# Patient Record
(demographics unavailable — no encounter records)

---

## 2024-10-23 NOTE — HISTORY OF PRESENT ILLNESS
[de-identified] : 81 year old male with hypertension, CAD on medical therapy, LV dysfunction out of proportion to the degree of CAD, s/p BiV ICD, CHF on Entresto, chronic atrial fibrillation on Eliquis, chronic foot ulcer, PVD, s/p toe amputation, CKD stage 3, dyslipidemia, Type 1 DM, diabetic peripheral neuropathy, acquired hypothyroidism, carotid atherosclerosis, hx of stroke 2014 s/p TPA, TIA 3/19/20.    S/p fall with left femur fracture s/p ORIF 4/19/24 at [facility] and transferred to [facility] from 4/30-5/31/24.   [facility] admission 7/11-7/17/24 for acute stroke with left sided weakness. Eliquis increased to 5mg BID. He has an upcoming visit with EP regarding Watchman procedure.   [facility] ED visit for persistent coughing/sensation of foreign object stuck on throat after swallowing calcium tablet on 9/19/24. Imaging was unremarkable and he was discharged later that day.   He is following with Wound Management regarding his bilateral heel ulcers.  Mr. Fall denies chest pain, SOB, palpitations, or syncope.

## 2024-10-23 NOTE — PROCEDURE
[Atrial Fibrillation] : atrial fibrillation [See Scanned Paceart Report] : See scanned paceart report [CRT-D] : Cardiac resynchronization therapy defibrillator [de-identified] : [facility]

## 2024-10-31 NOTE — HISTORY OF PRESENT ILLNESS
[FreeTextEntry1] : Last Prolia 6/17/24, fifth injection 82 yo WM with brittle HIRAM coming for f/u. On Medtronic pump, does not use bolus wizard, does not enter carbs or BG values. as he does not like it, due to his gastroparesis, tried square waves and he feels like his sugars were low at the end of the bolus for dinner  taking Calcium citrate 600mg bid and Vit D3 at 2,000 IU qd on top of Calcitriol, sees Dr Houser , parathyroid much improved had a fall hurt his knee does not exercise any longer   takes Synthroid at 2am when he goes to the bathroom, then goes back to sleep, still has diarrhea on and off, never increased his Synthroid as advised but TFTs better DEXCOM CGM downloaded and reviewed with the pt Medtronic insulin pump downloaded and reviewed with the pt     type 1 DM/HIRAM since age 27, complicated with nephropathy,retinopathy, ++ gastroparesis sees Dr Dominguez , PVD toe amputations  last seen Nutritionist two years ago   had diabetes classes more than 10 yrs ago , not interested to have again  on Medtronic insulin pump for 10yrs, present pump more than a year, with Humalog   Calibrates CGM once daily sometime less despite advised bid  severe hypoglicemia 1974, 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 checks 3-4 times during exercise and takes snacks.   does roughly 3/4 of an exchange for one unit of Humalog  bolusing 2-10 times a day      Microvascular complications:   Nephropathy yes, last EGFR 31 Cr 2.1, + 1 proteins in urine had 24hr urine for proteins with Dr Doran, had more Advil in the last 2 months due to shoulder pain, stopped completelly a week ago per pt   Neuropathy symptoms, denies Dr. Wray seen him today   Retinopathy yes bilateral lasser surgery and Vitrectomy, cataract last eye exam more than a year ago Dr Funez [place] , Date of Exam Performed 10/17/2018  [facility] reviewed   Macrovascular complications:   HTN at goal on Carvedilol , Entresto   CAD/CVA yes, CVA 18 months ago, left ventricular dysfunction, congestive heart failure, paroxysmal atrial fibrillation, valve abnormalities, coronary artery disease, sees Dr Baron regularly   Lipids at goal on Simvastatin 5mg qd with LFT's normal 4/16   last thyroid US 9/17 IMPRESSION: Mildly enlarged and somewhat heterogeneous thyroid without focal masses. on Synthroid 25mcg qd reports compliance   7/8/24 follow up- Pt now has an aide and ambulates w/ a wheelchair after recent femur fracture. Reports high sugars after hospital discharge and rehab, where he was not able to have his own insulin pump.  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 Got Prolia on 6/17/24. Fifth injection. Has had persistent diarrhea x 1 week. Has appointment with Dr. Dominguez upcoming.  Pt changed his primary care to Dr. Palma. He has a left heal wound after hospital admission to [facility]. Follows at [facility] center with Dr. Cuenca.   7/11/2024 had a stroke, left over left side weakness,  Dr. Haley -Neurologist  could not do MRI  choke on calcium ended up in ER  foot ulcer at [facility] while he had femur fracture doing hyperbaric chambe , will see plastic surgeon for possible skin transplant

## 2024-10-31 NOTE — REVIEW OF SYSTEMS
[Diarrhea] : diarrhea [Dizziness] : dizziness [Pain/Numbness of Digits] : pain/numbness of digits [Poor Balance] : poor balance [Negative] : Heme/Lymph

## 2024-10-31 NOTE — END OF VISIT
[FreeTextEntry3] : All medical record entries made by the Scribe were at my, KERMIT EDWARDS, direction and personally dictated by me on 7/9/2024. I have reviewed the chart and agree that the record accurately reflects my personal performance of the history, physical exam, assessment and plan. I have also personally directed, reviewed, and agreed with the chart.   Documented by Duc Olsen acting as a scribe for KERMIT EDWARDS on 7/9/2024.

## 2024-10-31 NOTE — PHYSICAL EXAM
[Alert] : alert [Well Nourished] : well nourished [Healthy Appearance] : healthy appearance [No Acute Distress] : no acute distress [Well Developed] : well developed [Normal Voice/Communication] : normal voice communication [Normal Sclera/Conjunctiva] : normal sclera/conjunctiva [No Lid Lag] : no lid lag [Normal Outer Ear/Nose] : the ears and nose were normal in appearance [Normal Hearing] : hearing was normal [Normal Lips/Gums] : the lips and gums were normal [No Respiratory Distress] : no respiratory distress [No Accessory Muscle Use] : no accessory muscle use [Normal Rate and Effort] : normal respiratory rate and effort [No Involuntary Movements] : no involuntary movements were seen [No Tremors] : no tremors [Oriented x3] : oriented to person, place, and time [Kyphosis] : no kyphosis present [Scoliosis] : no scoliosis [de-identified] : has pretibial edema b/l , Irregularly irregular Dual-chamber implanted defibrillator, Doctor Anthony, 4/3/2015.  on 2/2022

## 2024-10-31 NOTE — REASON FOR VISIT
[Follow - Up] : a follow-up visit [DM Type 1] : DM Type 1 [DM Type 2] : DM Type 2 [Hypothyroidism] : hypothyroidism [Hyperparathyroidism] : hyperparathyroidism [Spouse] : spouse

## 2024-11-06 NOTE — PHYSICAL EXAM
[General Appearance - Alert] : alert [General Appearance - In No Acute Distress] : in no acute distress [Sclera] : the sclera and conjunctiva were normal [Extraocular Movements] : extraocular movements were intact [Outer Ear] : the ears and nose were normal in appearance [Hearing Threshold Finger Rub Not [place]] : hearing was normal [Neck Appearance] : the appearance of the neck was normal [] : no respiratory distress [Exaggerated Use Of Accessory Muscles For Inspiration] : no accessory muscle use [Apical Impulse] : the apical impulse was normal [Heart Sounds] : normal S1 and S2 [Edema] : there was no peripheral edema [Veins - Varicosity Changes] : there were no varicosital changes [Bowel Sounds] : normal bowel sounds [Abdomen Soft] : soft [No CVA Tenderness] : no ~M costovertebral angle tenderness [No Spinal Tenderness] : no spinal tenderness [Skin Color & Pigmentation] : normal skin color and pigmentation [Cranial Nerves] : cranial nerves 2-12 were intact [Oriented To Time, Place, And Person] : oriented to person, place, and time [Affect] : the affect was normal [FreeTextEntry1] : unsteady gait

## 2024-11-06 NOTE — ASSESSMENT
[FreeTextEntry1] : Pleasant 80 yo male with longstanding DM ( 50+ yrs), CAD ( dual chamber pacemaker), AFIB, CHF(on entresto, lasix prn), BPH ( 5+ x/night on finasteride, US at Urologists several months ago showed no PVR per pt), required lloyd following hip#,, HTN,  peripheral neuropathy, autonomic dysfunction, initially referred for elevated cr ( 2-2.2 from 1/2019 to 7/2019) c/w CKD 3 - has improved, cr 1.8 - 2.3 as of 11/2019 and now over course of hospitalization, is around 1.2-1.5   CKD 3 - cr mid to high 1's,GFR 30's - Given combination of DM/CHF/CKD would start SGLT but is type 1 DM  HTN - at goal  Edema - per wife markedly improved upon awakening after lying flat , recurs once upright - is seated for most of the day, c/w dependentedema - rec ace raps  Anemia - Hgb, iron panel stable - cont nephrocap, no need for procrit.  Appropriately on Entresto and taking lasix prn - potassium stable, no indication for kionex. In re to ANS /orthostasis - given combination of CHF and DM with peripheral neuropathy and autonomic dysfunction limited - has diminished muscle mass along with reduced tone  - advised he start water exercise in hopes of regaining muscle and vascular tone and slowing progression  Hypotension/orthostasis - consider florinef prn ( developed worsening edema previously) - consider midodrine  Constipation -resolved  BPH - seeing Dr. Wilkinson, debating on GL, ? lloyd temporarily - rx flomax, but will refrain due to risk of hypotension   Cardiology, Endocrine, PCP notes reviewed, hosp notes reveiwed BMET, Hgb A1c, PTH, iron panel , CBC reviewed.

## 2024-11-06 NOTE — PHYSICAL EXAM
[General Appearance - Alert] : alert [General Appearance - In No Acute Distress] : in no acute distress [Sclera] : the sclera and conjunctiva were normal [Extraocular Movements] : extraocular movements were intact [Outer Ear] : the ears and nose were normal in appearance [Hearing Threshold Finger Rub Not [place]] : hearing was normal [Neck Appearance] : the appearance of the neck was normal [] : no respiratory distress [Exaggerated Use Of Accessory Muscles For Inspiration] : no accessory muscle use [Apical Impulse] : the apical impulse was normal [Heart Sounds] : normal S1 and S2 [Edema] : there was no peripheral edema [Veins - Varicosity Changes] : there were no varicosital changes [Bowel Sounds] : normal bowel sounds [Abdomen Soft] : soft [No CVA Tenderness] : no ~M costovertebral angle tenderness [No Spinal Tenderness] : no spinal tenderness [Skin Color & Pigmentation] : normal skin color and pigmentation [Cranial Nerves] : cranial nerves 2-12 were intact [Oriented To Time, Place, And Person] : oriented to person, place, and time [Affect] : the affect was normal [FreeTextEntry1] : unsteady gait

## 2024-11-06 NOTE — HISTORY OF PRESENT ILLNESS
[FreeTextEntry1] : Pleasant  81 male with longstanding DM ( 50+ yrs), CAD ( dual chamber pacemaker), AFIB, CHF (on entresto, lasix prn), BPH ( 5+ x/night was on finasteride, US at [facility]rologists showed no PVR per pt), HTN , peripheral neuropathy, autonomic dysfunction,  initially referred for elevated cr ( 2-2.2 from 1/2019 to 7/2019) c/w CKD 3 -   10/2024 f/u for CKD, autonomic dysfunction - s/p hospitalization for hip #, cva, was n rehab with limited recovery, being seen in wound care by IR, vascular and podiatry for le wounds, contine sto struggle with chronic venous insufficiency - wife states that after going to sleep , horizontally, edema nearly completely resolved by am, but over course of day le edema recures  has bruce foot drop  6/2024 here for f/u s/p hospitalization s/p hip #, ckd, autonomic dysfunction, hypotension, orthoistasis during hospitalization has had extreme diff controlling BP ( orthostatic, autonomic dysfunction), multiple confounding issues ( ie pain, pain meds, sleeping meds, hydration, malnutrition...) continues to have PT legs edematous ( edema had, for the most part, resolved prior to hospitalization with Hip#_ had been taking lasix on prn basis, during hospitalization it was held due to hypotension, hasnt resumed lasix, recommend he resume now, keep legs  elevated repeat labs today check xray   s/p surgical excision of melanoma R forearm - states he will need additional surgery, "too close to margins" s/p venous sclerotherapy R lower leg    3/2023 here for f/u ckd, autonomic dysfunction, hypotension s/p surgical excision of melanoma R forearm - states he will need additional surgery, "too close to margins" s/p venous sclerotherapy R lower leg  12/2023 since last visit, cracked R lower molar, complicated by development of " bone spurs" - was taking Motrin daily chronic pain noted to to be elevated since 10/2023  9/2023 here for f/u -  has since developed venous ulcer - seen by IR, wound care autonomic dysfunction DN pacemaker last labs in 2/2023, cr had improved to 1.6, gfr 43  - 2.18/20's,  8/2023 1.9/GFR  33 Endo, Cards, GI note reviewed   6/2023 here for f/u - returned from 16 day trip to [place], sig weight gain autonomic dysfunction DN pacemaker last labs in 2/2023, cr had improved to 1.6, gfr 43 Endo, Cards, GI note reviewed   12/2022 f/u [facility] on CKD/CKD 3/4 - was exp great diff walking due to light headed ness/hypotension attributed to autonomic dysfunction along with loss of proprioception from peripheral neuropathy, d/w cardiology, increased heart to 70 with marked improvement in gait - less unsteadiness  Managing as best he can with disease burden.  Overall stable - cr fluctuating between 1.8 and 2.3 since 2019;   Was using florinef for dizziness hypotension - developed edema, so holding at this time -  BPH with nocturia - anywhere from 3-10x/night, improved with glass of wine  3/1/2023 - cr improved from 2.4 to 2.09 to 1.87, more his baseline and as of 2/2023 was 1.62 - fluid management remains an issue given his le edema and underlying heart disease - pth elevated ( seeing Dr. Jansen) - no longer light headed since increasing HR on pacemaker   9/2022 - cr improved from 2.4 to 2.09 to 1.87, more his baseline - fluid management remains an issue given his le edema and underlying heart disease  6/10/2022 - cr improved from 2.4 to 2.09, more his baseline - fluid management remains an issue given his le edema and underlying heart disease  4/2022 - continues to have diff controlling BS, A1c 7's - cr adrienne to highest on record since 2019 with a level of 2.49 in 2/2022,  suspects it may be 2/2 uncontrolled  blood sugars, will be having new pump as per Dr. Jansen

## 2024-11-06 NOTE — REASON FOR VISIT
[Follow-Up] : a follow-up visit [Spouse] : spouse [FreeTextEntry1] : f/u CKD, hypotension, AUTONOMIC DYSFUNCTION

## 2024-11-06 NOTE — PHYSICAL EXAM
[General Appearance - Alert] : alert [General Appearance - In No Acute Distress] : in no acute distress [Sclera] : the sclera and conjunctiva were normal [Extraocular Movements] : extraocular movements were intact [Outer Ear] : the ears and nose were normal in appearance [Hearing Threshold Finger Rub Not [place]] : hearing was normal [Neck Appearance] : the appearance of the neck was normal [] : no respiratory distress [Exaggerated Use Of Accessory Muscles For Inspiration] : no accessory muscle use [Apical Impulse] : the apical impulse was normal [Heart Sounds] : normal S1 and S2 [Edema] : there was no peripheral edema [Veins - Varicosity Changes] : there were no varicosital changes [Bowel Sounds] : normal bowel sounds [Abdomen Soft] : soft [No CVA Tenderness] : no ~M costovertebral angle tenderness [No Spinal Tenderness] : no spinal tenderness [Skin Color & Pigmentation] : normal skin color and pigmentation [Cranial Nerves] : cranial nerves 2-12 were intact [Oriented To Time, Place, And Person] : oriented to person, place, and time [Affect] : the affect was normal [FreeTextEntry1] : unsteady gait

## 2024-11-06 NOTE — ASSESSMENT
[FreeTextEntry1] : Pleasant 82 yo male with longstanding DM ( 50+ yrs), CAD ( dual chamber pacemaker), AFIB, CHF(on entresto, lasix prn), BPH ( 5+ x/night on finasteride, US at [facility]rolog[facility] several months ago showed no PVR per pt), required lloyd following hip#,, HTN,  peripheral neuropathy, autonomic dysfunction, initially referred for elevated cr ( 2-2.2 from 1/2019 to 7/2019) c/w CKD 3 - has improved, cr 1.8 - 2.3 as of 11/2019 and now over course of hospitalization, is around 1.2-1.5   CKD 3 - cr mid to high 1's,GFR 30's - Given combination of DM/CHF/CKD would start SGLT but is type 1 DM  HTN - at goal  Edema - per wife markedly improved upon awakening after lying flat , recurs once upright - is seated for most of the day, c/w dependentedema - rec ace raps  Anemia - Hgb, iron panel stable - cont nephrocap, no need for procrit.  Appropriately on Entresto and taking lasix prn - potassium stable, no indication for kionex. In re to ANS /orthostasis - given combination of CHF and DM with peripheral neuropathy and autonomic dysfunction limited - has diminished muscle mass along with reduced tone  - advised he start water exercise in hopes of regaining muscle and vascular tone and slowing progression  Hypotension/orthostasis - consider florinef prn ( developed worsening edema previously) - consider midodrine  Constipation -resolved  BPH - seeing Dr. Wilkinson, debating on GL, ? lloyd temporarily - rx flomax, but will refrain due to risk of hypotension   Cardiology, Endocrine, PCP notes reviewed, hosp notes reveiwed BMET, Hgb A1c, PTH, iron panel , CBC reviewed.

## 2024-11-06 NOTE — PHYSICAL EXAM
[General Appearance - Alert] : alert [General Appearance - In No Acute Distress] : in no acute distress [Sclera] : the sclera and conjunctiva were normal [Extraocular Movements] : extraocular movements were intact [Outer Ear] : the ears and nose were normal in appearance [Hearing Threshold Finger Rub Not [place]] : hearing was normal [Neck Appearance] : the appearance of the neck was normal [] : no respiratory distress [Exaggerated Use Of Accessory Muscles For Inspiration] : no accessory muscle use [Apical Impulse] : the apical impulse was normal [Heart Sounds] : normal S1 and S2 [Edema] : there was no peripheral edema [Veins - Varicosity Changes] : there were no varicosital changes [Bowel Sounds] : normal bowel sounds [Abdomen Soft] : soft [No CVA Tenderness] : no ~M costovertebral angle tenderness [No Spinal Tenderness] : no spinal tenderness [Skin Color & Pigmentation] : normal skin color and pigmentation [Cranial Nerves] : cranial nerves 2-12 were intact [Oriented To Time, Place, And Person] : oriented to person, place, and time [Affect] : the affect was normal [FreeTextEntry1] : unsteady gait

## 2024-11-07 NOTE — REASON FOR VISIT
[Follow-Up Visit] : a follow-up visit for [FreeTextEntry1] :  Cerebrovascular event likely secondary to cardiac emboli with thrombolysis, 2014/probable brief TIA with aphasia March 2020 Coronary artery disease with left ventricular dysfunction out of proportion to the degree of disease, improved on therapy. Device therapy/[employer] dual-chamber biventricular pacemaker defibrillator Echo/valve abnormalities Carotid atherosclerosis Type 1 diabetes with chronic kidney disease and additional complications Dyslipidemia Peripheral vascular disease, both arterial and venous.  Status post right-sided venous ablation procedure. Hypertension with episodic orthostatic hypotension.  He has additional medical problems as noted.  His primary care physician is Doctor May Doran.

## 2024-11-07 NOTE — REVIEW OF SYSTEMS
[Negative] : Heme/Lymph [FreeTextEntry2] : Chronic fatigue.  Poor sleeping due to nocturia.  Slightly improved. [FreeTextEntry3] : History of bilateral cataract surgery with lens implant.  Diabetic retinopathy.  Laser surgery.  Right vitrectomy. [FreeTextEntry5] : See HPI. [FreeTextEntry6] : Chronic cough and throat clearing. [FreeTextEntry7] : Chronic episodic abdominal discomfort associated with diabetic gastroparesis, although reportedly the diagnosis is unconfirmed..  Episodic constipation. [FreeTextEntry8] : Significant nocturia, every 2 hours at times, somewhat improved.  Daytime frequency.  ED, permanent.  He has a penile implant.  Consultation with Dr. Wilkinson. [FreeTextEntry9] : Improved left shoulder injury.  Venous and arterial insufficiency.  Status post right-sided venous ablation procedure 2023. [de-identified] : History of squamous cell skin cancer.  Chronic lower extremity lesions. [de-identified] : He has a peripheral neuropathy.  Chronic imbalance.

## 2024-11-07 NOTE — ASSESSMENT
[FreeTextEntry1] : 81M   CVA July 2024 residual L sided weakness worse in leg  Chronic AFIB sCHF (non ischemic) - recovered LV function BiV ICD - generator change 2024 PAD  CAD IDDM  EKG for hx PPM -  biv paced Prior extensive records/imaging personally reviewed  - chronic leg swelling, recommend BNP and to update TTE to help guide his diuretic regimen.  Currently on lasix 20mg daily.  Lungs are clear, no respiratory symptoms.   Chronic renal disease noted. - no signs/sx of acute coronary ischemia  - known PAD mostly below knee, reduced MARLENY on left; he is scheduled for peripheral angiogram given concern for critical limb ischemia with potential for limb loss and life threatening infections.   There is no cardiac contraindication to low risk angiogram as revascularization.  Eliquis can be held 48 hours prior to procedure and should be resumed as soon as possible given his stroke risk.   - BPs are acceptable - continue lopressor 25mg bid, entresto 24-26mg BID - continue lipitor 10mg for CAD/HLD/PAD

## 2024-11-07 NOTE — PHYSICAL EXAM
[Well Developed] : well developed [Well Nourished] : well nourished [No Acute Distress] : no acute distress [Normal Conjunctiva] : normal conjunctiva [Normal Venous Pressure] : normal venous pressure [No Carotid Bruit] : no carotid bruit [Normal S1, S2] : normal S1, S2 [No Murmur] : no murmur [No Rub] : no rub [No Gallop] : no gallop [Clear Lung Fields] : clear lung fields [Good Air Entry] : good air entry [No Respiratory Distress] : no respiratory distress  [Soft] : abdomen soft [Non Tender] : non-tender [No Masses/organomegaly] : no masses/organomegaly [Normal Bowel Sounds] : normal bowel sounds [No Cyanosis] : no cyanosis [No Clubbing] : no clubbing [No Varicosities] : no varicosities [No Rash] : no rash [Moves all extremities] : moves all extremities [Normal Speech] : normal speech [Alert and Oriented] : alert and oriented [Normal memory] : normal memory [de-identified] : Neuropathic gait. [de-identified] : 1-2+ pitting edema b/l.  Feet wrapped in bandaging.  Status post 2 right toe amputations. [de-identified] : in wheelchair, left sided weakness

## 2024-11-07 NOTE — HISTORY OF PRESENT ILLNESS
[FreeTextEntry1] : This 81 year-old male former patient of Dr. Baron.  NICM dx ~ 2014, no significant CAD on cath at the time  (distal LCx 80%), HTN, AV block s/p BiV ICD, chronic atrial fibrillation on Eliquis, chronic foot ulcer, PAD, s/p toe amputations, CKD stage 3, dyslipidemia, Type 1 DM, diabetic peripheral neuropathy, acquired hypothyroidism, carotid atherosclerosis, hx of stroke 2014 s/p TPA, TIA 3/19/20, and recent stroke July 2024 with residual L sided weakness.  Eliquis increased to 5mg BID after this most recent stroke.  He was also in hospital/rehab for extended period of time in April/May 2024 for a left femur fracture that required repair and rehab.    He is currently minimally ambulatory due to the L leg weakness and has been dealing with chronic bilateral heel ulcerations requiring regular wound care.  He has a hx of PAD and is scheduled for peripheral angio next week with Dr. Zayas.   He has no complaints of SOB or CP.  He has chronic LE edema which per patient is improving.  He takes lasix 20mg daily.

## 2024-11-07 NOTE — CARDIOLOGY SUMMARY
[de-identified] : EKG 6/1/2021.  Underlying atrial fibrillation likely, unconfirmed.  Biventricular pacing with rightward axis.  No significant change. [de-identified] : Holter 7/9/2014. Sinus rhythm. Occasional VPC. Occasional APC. No evidence of  recurrent atrial fibrillation (status post cardioversion). No diary entries.  [de-identified] : Lexiscan MIBI 5/6/14. EF 25%. Fixed inferior defect with generalized hypokinesia. No  overt ischemia.\par  Stress test 5/1/15. 4 minutes. Intermediate protocol. [place] stage I. 3.5 METs.  57%. Nondiagnostic ECG (LBBB). \par   [de-identified] : Echo July 2023.  Normal left ventricular chamber size, thickness and overall systolic function.  EF estimated 55%.  Mildly dilated left and right atrium.  Mild MAC and chordal calcification.  Trace MR.  Mild aortic calcification without stenosis.  Trivial AI.  Device lead visualized right heart.  Trace TR.  Mildly elevated estimated PA, 51 mmHg.  Normal RV systolic function.  Compared with 3/30/2021, no significant change.  Echo.  3/30/2021.  A.  F.  Normal LV function and wall motion.  EF 60%.  Dilated atria.  MAC.  Mild or moderate MR.  Aortic sclerosis.  Trace AI.  PA 55.  Echo 8/2/19. A. F. Normal LV function. EF 65%. LVH, 1.3 cm. Dilated left and right atrium.     Calcified chordae tendineae. Moderate MR. Aortic calcification without stenosis. Mild TR.     PA 40 mm of mercury. Similar to 2017. Echo 4/11/2016. Normal LV function. Probable normal wall motion. LVH, 1.2 cm. EF 65%.    Mildly thickened mitral valve, mild MAC. Trace MR. Trivial aortic sclerosis with trace AI.   Gradient 9/4. PA 17-22.   JAMEL 6/4/14. EF 20-25%. Performed pre-cardioversion.  [de-identified] : MUGA 2/23/15. Ischemic cardiomyopathy, EF 33%.\par   [de-identified] : Dual-chamber implanted defibrillator, Doctor Anthony, 4/3/2015. LV lead could not be placed  initially. Re-attempt 7/2015 was successful [employer] ( Guidant ) device\par   [de-identified] : Cardiac catheterization 6/4/14. Distal LCx 80% stenosis, 40% proximal. Medical therapy. LV dysfunction is out of proportion to the degree of CAD. \par   [de-identified] : Vascular evaluation 3/5/2022.  Greater than 75% stenosis right distal popliteal/proximal tibial peroneal trunk.  Right occluded peroneal artery.  Left occluded peroneal and anterior tibial artery.  Bilateral dorsalis pedis arteries not visualized.  Heavily calcification trifurcation.  Borderline reduced ABIs.\par  Carotid duplex 12/11/2019. Mild plaque bilateral. No change from 2015.\par   [de-identified] : CT scan chest 1/ Left lower lobe consolidationand associated layering pleural effusion.  Coronary calcifications.\par  PFT 1/13/16. Moderately severe diffusion defect, unchanged from 2014.\par  PFT obtained from 6/30/2014. Minimal obstructive airways disease. Moderate diffusion  defect. Refer to complete report. DLCO 56% predicted. Baseline study. Amiodarone just  started. \par  \par

## 2024-11-19 NOTE — ASSESSMENT
[FreeTextEntry1] : Mr. KASI PETERSON is a 81-year-old male here today for secondary stroke prevention   Would advise the following :  1) compliance with anticoagulants Eliquis 5 mg bid  2) Assure BP goal < 120/80 mm Hg maintained 3) continue current statin as LDL on 10/24 66 4) Incontinence may be indirectly related to stroke as it limits mobility; site of actual cerebral injury unknown as he cannot get BELLE brain  5) Discussed resuming Continue PT at minimum 2 times per week as he is showing regression in stroke recovery 6) We discussed PT goals, and he hopes to get to walking with walker short distances again which also will minimize risk of falls 
2022

## 2024-11-19 NOTE — REVIEW OF SYSTEMS
[Fever] : no fever [Chills] : no chills [Feeling Poorly] : feeling poorly [Feeling Tired] : feeling tired [Suicidal] : not suicidal [Sleep Disturbances] : no sleep disturbances [Anxiety] : no anxiety [Depression] : no depression [Confused or Disoriented] : no confusion [Memory Lapses or Loss] : no memory loss [Decr. Concentrating Ability] : no decrease in concentrating ability [Difficulty with Language] : no ~M difficulty with language [Changed Thought Patterns] : no change in thought patterns [Repeating Questions] : no repeated questioning about recent events [Facial Weakness] : no facial weakness [Arm Weakness] : no arm weakness [Hand Weakness] : no hand weakness [Leg Weakness] : no leg weakness [Poor Coordination] : poor coordination [Difficulty Writing] : no difficulty writing [Difficulties in Speech] : no speech difficulties [Numbness] : no numbness [Tingling] : no tingling [Abnormal Sensation] : no abnormal sensation [Hypersensitivity] : no hypersensitivity [Seizures] : no convulsions [Dizziness] : no dizziness [Fainting] : no fainting [Lightheadedness] : no lightheadedness [Vertigo] : no vertigo [Cluster Headache] : no cluster headache [Migraine Headache] : no migraine headache [Tension Headache] : no tension-type headache [Difficulty Walking] : difficulty walking [Inability to Walk] : inability to walk [Ataxia] : ataxia [Frequent Falls] : not falling [Limping] : not limping [Eye Pain] : no eye pain [Eyesight Problems] : no eyesight problems [Nosebleeds] : no nosebleeds [Heart Rate Is Slow] : the heart rate was not slow [Heart Rate Is Fast] : the heart rate was not fast [Chest Pain] : no chest pain [Palpitations] : no palpitations [Shortness Of Breath] : no shortness of breath [Cough] : no cough [Incontinence] : incontinence [Arthralgias] : no arthralgias [Joint Pain] : no joint pain [Joint Swelling] : no joint swelling [Limb Pain] : no limb pain [Limb Swelling] : limb swelling [As Noted in HPI] : as noted in HPI [Skin Wound] : skin wound

## 2024-11-19 NOTE — HISTORY OF PRESENT ILLNESS
[FreeTextEntry1] : Mr. KASI PETERSON is a 81 year old male here today for neurology follow up for stroke prevention Hx stroke 2014 received TPA (no residual deficits), TIA ( transient aphasia) when Eliquis at 2.5 bid, afib on Eliquis 5 mg BID now , AICD with [employer], DM type 1, diabetic peripheral neuropathy, atherosclerotic PVD, BPH, CAD, carotid atherosclerosis, CKD 3, HLD, hyperparathyroidism, and left traumatic femur fracture , and acute stroke leading to hospitalization again in July 2024 resulting in LHP. He has been troubled with sore over heel of left foot which is limiting ambulation. He also reports that home PT only occurring once per week as there are not enough therapist in the home therapy team. He has noted that LUE is getting weaker and clumsy again with less PT.  He is also getting VNS to help with wound care. He just underwent procedure with Dr. Stout to improve distal peripheral blood flow in left leg and hyperbaric treatment to accelerate wound healing. He is going to see a plastic surgeon for possible skin graft soon.  He also had battery replaced in ICD.

## 2024-11-19 NOTE — PHYSICAL EXAM
[FreeTextEntry1] : PHYSICAL EXAM BLE edema up to knees Pt cannot move his toes b/l, unable to raise heels off the ground.   Mental Status Awake, alert and oriented to person, place, time and situation. Provides detailed history.  Speech: clear , fluent, follows verbal requests   Cranial Nerves II: VFF III, IV, VI: PERRL, EOMI. V: Facial sensation is normal B/L. VII: left facial asymmetry noted  VIII: wnl IX, X: Palate is midline and elevates symmetrically. XI: Trapezius normal strength. XII: Tongue midline without atrophy or fasciculations.  MOTOR EXAM Muscle tone - no evidence of rigidity or resistance in all 4 extremities. LUE 4/5  and LLE proximal hip flexor and knee extendor  but he cannot move feet  RUE and RLE 5/5 except dorsiflexion 1/5   REFLEXES 1+ arm, 2+ knee, absent ankle Right Plantar: mute. Left Plantar: mute.   COORDINATION Finger to nose: dysmetria noted with LUE  Heel to shin: limited    SENSORY Marked impaired sensation in both legs in stocking distribution ot light touch, pinprick and temperature Impaired proprioception in toes impaired vibration at toes and ankle  reduced light touch and pinprick in hands in glove fashioin    GAIT cannot ambulate

## 2024-11-19 NOTE — DATA REVIEWED
[de-identified] : Exam Date: 05/18/17 Exam: CT LUMBAR SPINE Order#: [id number] PROCEDURE: CT scan lumbar spine, 5/18/2017 TECHNIQUE:  views. Axial images. Multiple window and level settings. Sagittal and coronal reconstructions. Oblique axial reconstructions. COMPARISON: CT scan 4/29/2016. CLINICAL INFORMATION: Lumbar radiculopathy. Patient reports difficulty with ambulation possibly indicating diabetic neuropathy. IMAGING FINDINGS:  views demonstrate defibrillator electrodes. A penile implant is demonstrated. Patient should not undergo MRI imaging unless these devices are known to be MRI compatible and appropriate conditions are met. There is decreased bone density consistent with osteopenia/osteoporosis. There is no focal lytic or blastic malignant-type bone destruction. There is no vertebral compression fracture. There are small anterior/bilateral vertebral body osteophytes at multiple levels, spondylosis deformans. T11-T12: No bulge or herniation. Mild facet osteoarthritis. T12-L1: No bulge or herniation. L1-L2: Mild disc bulge extends to the left side of the vertebral body. There is mild left foramen narrowing, but no nerve root compression. L2-L3: Mild disc bulge. L3-L4: Mild disc bulge. Thickening of the ligamentum flavum. Mild central spinal stenosis without significant change. L4-L5: Disc bulge. Thickening of the ligamentum flavum. Moderate central spinal stenosis. There is subarticular zone stenosis with bilateral L5 nerve root compression. Moderate foramen stenosis with mild bilateral L4 nerve root compression. No significant change (although I described findings differently on my report of the previous study). L5-S1: Mild facet osteoarthritis and thickening of the ligamentum flavum. Minimal degenerative anterolisthesis. Mild disc bulge. Disc bulge extends more to the right than the left, but there is no focal disc herniation. Mild central spinal stenosis with mild compression of bilateral S1 nerve roots, right greater than left. Degenerative osteophytes of sacroiliac joints are again demonstrated. There is no paraspinal mass. Again demonstrated is atherosclerotic calcification in the abdominal aorta and multiple branches. The study includes limited images of the entire abdomen/pelvis. Equivalent comparison images are not available. Study was not performed with body imaging technique. Study demonstrates multiple superficial varicosities of the abdominal wall on both sides. There is no gross evidence of liver lesion or inferior vena cava lesion. Recommend correlation with clinical examination to determine whether or not there has been a change in the appearance of the superficial varicosities that might prompt further evaluation. IMPRESSION: No significant interval change. (My description of some findings differs from my description on the report of the previous study. Current report is based on evaluation of images from both studies.) Moderate central spinal stenosis L4-L5. Moderate bilateral foramen stenosis L4-L5. Mild central spinal stenosis L3-L4, L5-S1. Disc bulge L5-S1 extends more to the right than the left greater compression of the right side of the thecal sac in the right S1 nerve root at the left side of the thecal sac than the left S1 nerve root. No focal disc herniation. There are multiple superficial varicosities in the abdominal wall. Finding may be incidental. Please correlate with clinical examination is to whether or not these varicosities have changed. ********************************************************************* EMG nerve conduction studies in June of 2017 which showed a severe sensory motor peripheral neuropathy. ******************************************************************* exam Date:      03/18/20    [facility]                                                             Exam:         CT HEAD-CODE STROKE ED                                                           Order#:       [id number]                                                                HEAD CT WITHOUT CONTRAST dated 3/18/2020 11:28 PM        HISTORY: Difficulty speaking. Stroke code.      TECHNIQUE: Head CT was performed without intravenous contrast. Axial, sagittal, and coronal images  were obtained.     COMPARISON: Head CT from 2/11/2014.      FINDINGS:   There are prominent ventricles and sulci most compatible with age-appropriate generalized  parenchymal volume loss. No acute transcortical infarction, hemorrhage, or mass effect is  identified. Chronic lacunar infarcts are again seen within the bilateral lentiform nuclei and left  frontal white matter. Grossly stable mild hypodensities in the supratentorial white matter are  consistent with chronic microvascular ischemic disease.       The calvarium is intact. The visualized paranasal sinuses and mastoid air cells are clear. Both  native lenses are absent.    IMPRESSION:     No evidence of acute transcortical infarction or hemorrhage. No substantial change since 2014.

## 2024-11-27 NOTE — HISTORY OF PRESENT ILLNESS
[FreeTextEntry1] : Last Prolia 6/17/24, fifth injection 82 yo WM with brittle HIRAM coming for f/u. On Medtronic pump, does not use bolus wizard, does not enter carbs or BG values. as he does not like it, due to his gastroparesis, tried square waves and he feels like his sugars were low at the end of the bolus for dinner  taking Calcium citrate 600mg bid and Vit D3 at 2,000 IU qd on top of Calcitriol, sees Dr Houser , parathyroid much improved had a fall hurt his knee does not exercise any longer   takes Synthroid at 2am when he goes to the bathroom, then goes back to sleep, still has diarrhea on and off, never increased his Synthroid as advised but TFTs better DEXCOM CGM downloaded and reviewed with the pt Medtronic insulin pump downloaded and reviewed with the pt     type 1 DM/HIRAM since age 27, complicated with nephropathy,retinopathy, ++ gastroparesis sees Dr Dominguez , PVD toe amputations  last seen Nutritionist two years ago   had diabetes classes more than 10 yrs ago , not interested to have again  on Medtronic insulin pump for 10yrs, present pump more than a year, with Humalog   Calibrates CGM once daily sometime less despite advised bid  severe hypoglicemia 1974, 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 checks 3-4 times during exercise and takes snacks.   does roughly 3/4 of an exchange for one unit of Humalog  bolusing 2-10 times a day      Microvascular complications:   Nephropathy yes, last EGFR 31 Cr 2.1, + 1 proteins in urine had 24hr urine for proteins with Dr Doran, had more Advil in the last 2 months due to shoulder pain, stopped completelly a week ago per pt   Neuropathy symptoms, denies Dr. Wray seen him today   Retinopathy yes bilateral lasser surgery and Vitrectomy, cataract last eye exam more than a year ago Dr Funez [place] , Date of Exam Performed 10/17/2018  [facility] reviewed   Macrovascular complications:   HTN at goal on Carvedilol , Entresto   CAD/CVA yes, CVA 18 months ago, left ventricular dysfunction, congestive heart failure, paroxysmal atrial fibrillation, valve abnormalities, coronary artery disease, sees Dr Baron regularly   Lipids at goal on Simvastatin 5mg qd with LFT's normal 4/16   last thyroid US 9/17 IMPRESSION: Mildly enlarged and somewhat heterogeneous thyroid without focal masses. on Synthroid 25mcg qd reports compliance   7/8/24 follow up- Pt now has an aide and ambulates w/ a wheelchair after recent femur fracture. Reports high sugars after hospital discharge and rehab, where he was not able to have his own insulin pump.  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 Got Prolia on 6/17/24. Fifth injection. Has had persistent diarrhea x 1 week. Has appointment with Dr. Dominguez upcoming.  Pt changed his primary care to Dr. Palma. He has a left heal wound after hospital admission to [facility]. Follows at [facility] center with Dr. Cuenca.   7/11/2024 had a stroke, left over left side weakness,  Dr. Haley -Neurologist  could not do MRI  choke on calcium ended up in ER  foot ulcer at [facility] while he had femur fracture doing hyperbaric chambe , will see plastic surgeon for possible skin transplant

## 2024-11-27 NOTE — HISTORY OF PRESENT ILLNESS
[FreeTextEntry1] : Last Prolia 6/17/24, fifth injection 80 yo WM with brittle HIRAM coming for f/u. On Medtronic pump, does not use bolus wizard, does not enter carbs or BG values. as he does not like it, due to his gastroparesis, tried square waves and he feels like his sugars were low at the end of the bolus for dinner  taking Calcium citrate 600mg bid and Vit D3 at 2,000 IU qd on top of Calcitriol, sees Dr Houser , parathyroid much improved had a fall hurt his knee does not exercise any longer   takes Synthroid at 2am when he goes to the bathroom, then goes back to sleep, still has diarrhea on and off, never increased his Synthroid as advised but TFTs better DEXCOM CGM downloaded and reviewed with the pt Medtronic insulin pump downloaded and reviewed with the pt     type 1 DM/HIRAM since age 27, complicated with nephropathy,retinopathy, ++ gastroparesis sees Dr Dominguez , PVD toe amputations  last seen Nutritionist two years ago   had diabetes classes more than 10 yrs ago , not interested to have again  on Medtronic insulin pump for 10yrs, present pump more than a year, with Humalog   Calibrates CGM once daily sometime less despite advised bid  severe hypoglicemia 1974, 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 checks 3-4 times during exercise and takes snacks.   does roughly 3/4 of an exchange for one unit of Humalog  bolusing 2-10 times a day      Microvascular complications:   Nephropathy yes, last EGFR 31 Cr 2.1, + 1 proteins in urine had 24hr urine for proteins with Dr Doran, had more Advil in the last 2 months due to shoulder pain, stopped completelly a week ago per pt   Neuropathy symptoms, denies Dr. Wray seen him today   Retinopathy yes bilateral lasser surgery and Vitrectomy, cataract last eye exam more than a year ago Dr Funez [place] , Date of Exam Performed 10/17/2018  [facility] reviewed   Macrovascular complications:   HTN at goal on Carvedilol , Entresto   CAD/CVA yes, CVA 18 months ago, left ventricular dysfunction, congestive heart failure, paroxysmal atrial fibrillation, valve abnormalities, coronary artery disease, sees Dr Baron regularly   Lipids at goal on Simvastatin 5mg qd with LFT's normal 4/16   last thyroid US 9/17 IMPRESSION: Mildly enlarged and somewhat heterogeneous thyroid without focal masses. on Synthroid 25mcg qd reports compliance   7/8/24 follow up- Pt now has an aide and ambulates w/ a wheelchair after recent femur fracture. Reports high sugars after hospital discharge and rehab, where he was not able to have his own insulin pump.  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 Got Prolia on 6/17/24. Fifth injection. Has had persistent diarrhea x 1 week. Has appointment with Dr. Dominguez upcoming.  Pt changed his primary care to Dr. Palma. He has a left heal wound after hospital admission to [facility]. Follows at [facility] center with Dr. Cuenca.   7/11/2024 had a stroke, left over left side weakness,  Dr. Haley -Neurologist  could not do MRI  choke on calcium ended up in ER  foot ulcer at [facility] while he had femur fracture doing hyperbaric chambe , will see plastic surgeon for possible skin transplant

## 2024-12-08 NOTE — HISTORY OF PRESENT ILLNESS
[Home] : at home, [unfilled] , at the time of the visit. [Medical Office: ([facility])___] : at the medical office located in  [FreeTextEntry1] : Last Prolia 6/17/24, fifth injection 80 yo WM with brittle HIRAM coming for f/u. On Medtronic pump, does not use bolus wizard, does not enter carbs or BG values. as he does not like it, due to his gastroparesis, tried square waves and he feels like his sugars were low at the end of the bolus for dinner  taking Calcium citrate 600mg bid and Vit D3 at 2,000 IU qd on top of Calcitriol, sees Dr Houser , parathyroid much improved had a fall hurt his knee does not exercise any longer   takes Synthroid at 2am when he goes to the bathroom, then goes back to sleep, still has diarrhea on and off, never increased his Synthroid as advised but TFTs better DEXCOM CGM downloaded and reviewed with the pt Medtronic insulin pump downloaded and reviewed with the pt     type 1 DM/HIRAM since age 27, complicated with nephropathy,retinopathy, ++ gastroparesis sees Dr Dominguez , PVD toe amputations  last seen Nutritionist two years ago   had diabetes classes more than 10 yrs ago , not interested to have again  on Medtronic insulin pump for 10yrs, present pump more than a year, with Humalog   Calibrates CGM once daily sometime less despite advised bid  severe hypoglicemia 1974, 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 checks 3-4 times during exercise and takes snacks.   does roughly 3/4 of an exchange for one unit of Humalog  bolusing 2-10 times a day      Microvascular complications:   Nephropathy yes, last EGFR 31 Cr 2.1, + 1 proteins in urine had 24hr urine for proteins with Dr Doran, had more Advil in the last 2 months due to shoulder pain, stopped completelly a week ago per pt   Neuropathy symptoms, denies Dr. Wray seen him today   Retinopathy yes bilateral lasser surgery and Vitrectomy, cataract last eye exam more than a year ago Dr Funez [place] , Date of Exam Performed 10/17/2018  [facility] reviewed   Macrovascular complications:   HTN at goal on Carvedilol , Entresto   CAD/CVA yes, CVA 18 months ago, left ventricular dysfunction, congestive heart failure, paroxysmal atrial fibrillation, valve abnormalities, coronary artery disease, sees Dr Baron regularly   Lipids at goal on Simvastatin 5mg qd with LFT's normal 4/16   last thyroid US 9/17 IMPRESSION: Mildly enlarged and somewhat heterogeneous thyroid without focal masses. on Synthroid 25mcg qd reports compliance   7/8/24 follow up- Pt now has an aide and ambulates w/ a wheelchair after recent femur fracture. Reports high sugars after hospital discharge and rehab, where he was not able to have his own insulin pump.  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 Got Prolia on 6/17/24. Fifth injection. Has had persistent diarrhea x 1 week. Has appointment with Dr. Dominguez upcoming.  Pt changed his primary care to Dr. Palma. He has a left heal wound after hospital admission to [facility]. Follows at [facility] center with Dr. Cuenca.   7/11/2024 had a stroke, left over left side weakness,  Dr. Haley -Neurologist  could not do MRI  choke on calcium ended up in ER  foot ulcer at [facility] while he had femur fracture doing hyperbaric chamber , will see plastic surgeon for possible skin transplant   11/27/24 pt did not receive the info about SS and CHO counting from RN per pt, asking to talk with MD see task

## 2024-12-19 NOTE — ASSESSMENT
[FreeTextEntry1] : Pleasant 82 yo male with longstanding DM ( 50+ yrs), CAD ( dual chamber pacemaker), AFIB, CHF(on entresto, lasix prn), BPH ( 5+ x/night on finasteride, US at Urologists several months ago showed no PVR per pt), required lloyd following hip#, HTN,  peripheral neuropathy, autonomic dysfunction, initially referred for elevated cr ( 2-2.2 from 1/2019 to 7/2019) c/w CKD 3 - has improved, cr 1.8 - 2.3 as of 11/2019 improved to around 1.2-1.5, but has since increased to 2-2.1.   CKD 3 - cr mid to high 1's,GFR 30's - Given combination of DM/CHF/CKD would start SGLT but is type 1 DM  HTN - at goal - ran out of metoprolol, refill  Edema -has lost approx 23 lbs since combining eplerenone and lasix - check bmet   Anemia - Hgb, iron panel stable  - cont nephrocap, no need for procrit.  Hypotension/orthostasis - consider florinef prn ( developed worsening edema previously) - consider midodrine  Constipation -resolved  BPH - seeing Dr. Wilkinson, debating on GL, ? lolyd temporarily - rx flomax, but will refrain due to risk of hypotension   Cardiology, Endocrine, PCP notes reviewed, hosp notes reveiwed BMET, Hgb A1c, PTH, iron panel , CBC reviewed.

## 2024-12-19 NOTE — HISTORY OF PRESENT ILLNESS
[FreeTextEntry1] : Pleasant  81 male with longstanding DM ( 50+ yrs), CAD ( dual chamber pacemaker), AFIB, CHF (on entresto, lasix prn), BPH ( 5+ x/night was on finasteride, US at Urologists showed no PVR per pt), HTN , peripheral neuropathy, autonomic dysfunction,  initially referred for elevated cr ( 2-2.2 from 1/2019 to 7/2019) c/w CKD 3 -   12/2024  10/2024 f/u for CKD, autonomic dysfunction - s/p hospitalization for hip #, cva, was n rehab with limited recovery, being seen in wound care by IR, vascular and podiatry for le wounds, contine sto struggle with chronic venous insufficiency - wife states that after going to sleep , horizontally, edema nearly completely resolved by am, but over course of day le edema recures  has bruce foot drop  6/2024 here for f/u s/p hospitalization s/p hip #, ckd, autonomic dysfunction, hypotension, orthoistasis during hospitalization has had extreme diff controlling BP ( orthostatic, autonomic dysfunction), multiple confounding issues ( ie pain, pain meds, sleeping meds, hydration, malnutrition...) continues to have PT legs edematous ( edema had, for the most part, resolved prior to hospitalization with Hip#_ had been taking lasix on prn basis, during hospitalization it was held due to hypotension, hasnt resumed lasix, recommend he resume now, keep legs  elevated repeat labs today check xray   s/p surgical excision of melanoma R forearm - states he will need additional surgery, "too close to margins" s/p venous sclerotherapy R lower leg    3/2023 here for f/u ckd, autonomic dysfunction, hypotension s/p surgical excision of melanoma R forearm - states he will need additional surgery, "too close to margins" s/p venous sclerotherapy R lower leg  12/2023 since last visit, cracked R lower molar, complicated by development of " bone spurs" - was taking Motrin daily chronic pain noted to to be elevated since 10/2023  9/2023 here for f/u -  has since developed venous ulcer - seen by IR, wound care autonomic dysfunction DN pacemaker last labs in 2/2023, cr had improved to 1.6, gfr 43  - 2.18/20's,  8/2023 1.9/GFR  33 Endo, Cards, GI note reviewed   6/2023 here for f/u - returned from 16 day trip to [place], sig weight gain autonomic dysfunction DN pacemaker last labs in 2/2023, cr had improved to 1.6, gfr 43 Endo, Cards, GI note reviewed   12/2022 f/u SOMMER on CKD/CKD 3/4 - was exp great diff walking due to light headed ness/hypotension attributed to autonomic dysfunction along with loss of proprioception from peripheral neuropathy, d/w cardiology, increased heart to 70 with marked improvement in gait - less unsteadiness  Managing as best he can with disease burden.  Overall stable - cr fluctuating between 1.8 and 2.3 since 2019;   Was using florinef for dizziness hypotension - developed edema, so holding at this time -  BPH with nocturia - anywhere from 3-10x/night, improved with glass of wine  3/1/2023 - cr improved from 2.4 to 2.09 to 1.87, more his baseline and as of 2/2023 was 1.62 - fluid management remains an issue given his le edema and underlying heart disease - pth elevated ( seeing Dr. Jansen) - no longer light headed since increasing HR on pacemaker   9/2022 - cr improved from 2.4 to 2.09 to 1.87, more his baseline - fluid management remains an issue given his le edema and underlying heart disease  6/10/2022 - cr improved from 2.4 to 2.09, more his baseline - fluid management remains an issue given his le edema and underlying heart disease  4/2022 - continues to have diff controlling BS, A1c 7's - cr adrienne to highest on record since 2019 with a level of 2.49 in 2/2022,  suspects it may be 2/2 uncontrolled  blood sugars, will be having new pump as per Dr. Jansen

## 2024-12-19 NOTE — PHYSICAL EXAM
[General Appearance - Alert] : alert [General Appearance - In No Acute Distress] : in no acute distress [Sclera] : the sclera and conjunctiva were normal [Extraocular Movements] : extraocular movements were intact [Outer Ear] : the ears and nose were normal in appearance [Hearing Threshold Finger Rub Not [place]] : hearing was normal [Neck Appearance] : the appearance of the neck was normal [] : no respiratory distress [Exaggerated Use Of Accessory Muscles For Inspiration] : no accessory muscle use [Apical Impulse] : the apical impulse was normal [Heart Sounds] : normal S1 and S2 [Bowel Sounds] : normal bowel sounds [Abdomen Soft] : soft [No CVA Tenderness] : no ~M costovertebral angle tenderness [No Spinal Tenderness] : no spinal tenderness [Skin Color & Pigmentation] : normal skin color and pigmentation [Cranial Nerves] : cranial nerves 2-12 were intact [Oriented To Time, Place, And Person] : oriented to person, place, and time [Affect] : the affect was normal [FreeTextEntry1] : unsteady gait

## 2025-01-07 NOTE — REASON FOR VISIT
[FreeTextEntry2] : HF, SOMMER, afib, ppm  [FreeTextEntry1] : Cerebrovascular event likely secondary to cardiac emboli with thrombolysis, 2014/probable brief TIA with aphasia March 2020 Coronary artery disease with left ventricular dysfunction out of proportion to the degree of disease, improved on therapy. Device therapy/[employer] dual-chamber biventricular pacemaker defibrillator Echo/valve abnormalities Carotid atherosclerosis Type 1 diabetes with chronic kidney disease and additional complications Dyslipidemia Peripheral vascular disease, both arterial and venous.  Status post right-sided venous ablation procedure. Hypertension with episodic orthostatic hypotension.  He has additional medical problems as noted.  His primary care physician is Doctor May Doran.

## 2025-01-07 NOTE — REVIEW OF SYSTEMS
[FreeTextEntry2] : Chronic fatigue.  Poor sleeping due to nocturia.  Slightly improved. [FreeTextEntry3] : History of bilateral cataract surgery with lens implant.  Diabetic retinopathy.  Laser surgery.  Right vitrectomy. [FreeTextEntry5] : See HPI. [FreeTextEntry6] : Chronic cough and throat clearing. [FreeTextEntry7] : Chronic episodic abdominal discomfort associated with diabetic gastroparesis, although reportedly the diagnosis is unconfirmed..  Episodic constipation. [FreeTextEntry8] : Significant nocturia, every 2 hours at times, somewhat improved.  Daytime frequency.  ED, permanent.  He has a penile implant.  Consultation with Dr. Wilkinson. [FreeTextEntry9] : Improved left shoulder injury.  Venous and arterial insufficiency.  Status post right-sided venous ablation procedure 2023. [de-identified] : History of squamous cell skin cancer.  Chronic lower extremity lesions. [de-identified] : He has a peripheral neuropathy.  Chronic imbalance.

## 2025-01-07 NOTE — CARDIOLOGY SUMMARY
[de-identified] : EKG 6/1/2021.  Underlying atrial fibrillation likely, unconfirmed.  Biventricular pacing with rightward axis.  No significant change. [de-identified] : Lexiscan MIBI 5/6/14. EF 25%. Fixed inferior defect with generalized hypokinesia. No  overt ischemia.\par  Stress test 5/1/15. 4 minutes. Intermediate protocol. [place] stage I. 3.5 METs.  57%. Nondiagnostic ECG (LBBB). \par   [de-identified] : Echo July 2023.  Normal left ventricular chamber size, thickness and overall systolic function.  EF estimated 55%.  Mildly dilated left and right atrium.  Mild MAC and chordal calcification.  Trace MR.  Mild aortic calcification without stenosis.  Trivial AI.  Device lead visualized right heart.  Trace TR.  Mildly elevated estimated PA, 51 mmHg.  Normal RV systolic function.  Compared with 3/30/2021, no significant change.  Echo.  3/30/2021.  A.  F.  Normal LV function and wall motion.  EF 60%.  Dilated atria.  MAC.  Mild or moderate MR.  Aortic sclerosis.  Trace AI.  PA 55.  Echo 8/2/19. A. F. Normal LV function. EF 65%. LVH, 1.3 cm. Dilated left and right atrium.     Calcified chordae tendineae. Moderate MR. Aortic calcification without stenosis. Mild TR.     PA 40 mm of mercury. Similar to 2017. Echo 4/11/2016. Normal LV function. Probable normal wall motion. LVH, 1.2 cm. EF 65%.    Mildly thickened mitral valve, mild MAC. Trace MR. Trivial aortic sclerosis with trace AI.   Gradient 9/4. PA 17-22.   JAMEL 6/4/14. EF 20-25%. Performed pre-cardioversion.  [de-identified] : MUGA 2/23/15. Ischemic cardiomyopathy, EF 33%.\par   [de-identified] : Dual-chamber implanted defibrillator, Doctor Anthony, 4/3/2015. LV lead could not be placed  initially. Re-attempt 7/2015 was successful [employer] ( [employer]ant ) device\par   [de-identified] : Cardiac catheterization 6/4/14. Distal LCx 80% stenosis, 40% proximal. Medical therapy. LV dysfunction is out of proportion to the degree of CAD. \par   [de-identified] : Vascular evaluation 3/5/2022.  Greater than 75% stenosis right distal popliteal/proximal tibial peroneal trunk.  Right occluded peroneal artery.  Left occluded peroneal and anterior tibial artery.  Bilateral dorsalis pedis arteries not visualized.  Heavily calcification trifurcation.  Borderline reduced ABIs.\par  Carotid duplex 12/11/2019. Mild plaque bilateral. No change from 2015.\par   [de-identified] : CT scan chest 1/ Left lower lobe consolidationand associated layering pleural effusion.  Coronary calcifications.\par  PFT 1/13/16. Moderately severe diffusion defect, unchanged from 2014.\par  PFT obtained from 6/30/2014. Minimal obstructive airways disease. Moderate diffusion  defect. Refer to complete report. DLCO 56% predicted. Baseline study. Amiodarone just  started. \par  \par   [de-identified] : Echo 12/3/24: 1. Left ventricular cavity is normal in size. Left ventricular wall thickness is normal. Left ventricular systolic function is normal with an ejection fraction of 54 % by Acosta's method of disks. There are no regional wall motion abnormalities seen. 2. Moderate asymmetric left ventricular hypertrophy. 3. Unable to assess left ventricular diastolic function due to insufficient data. 4. Moderately enlarged right ventricular cavity size and borderline reduced right ventricular systolic function. 5. Left atrium is moderately dilated. 6. The right atrium is dilated. 7. Probably mild aortic stenosis. 8. Mild mitral regurgitation. 9. Mild tricuspid regurgitation. 10. Estimated pulmonary artery systolic pressure is 59 mmHg, consistent with moderate-to-severe pulmonary hypertension. 11. No pericardial effusion seen. 12. Left pleural effusion noted. 13. Device lead is visualized in the right heart.

## 2025-01-07 NOTE — ASSESSMENT
[FreeTextEntry1] : 81M   CVA July 2024 residual L sided weakness worse in leg  Chronic AFIB sCHF (non ischemic) - recovered LV function BiV ICD - generator change 2024 PAD  CAD IDDM  EKG for hx PPM -  biv paced Prior extensive records/imaging personally reviewed  - SOMMER is improving, possibly contrast induced? He is very prone to volume overload, restarted lasix 20mg daily > 1 week ago, BUN/Cr are continuing to improve.  His weight has gone up ~ 7 lbs, rec increasing regimen to 40/20mg alternating days.  repeat bmp in 2 weeks.    - no signs/sx of acute coronary ischemia  - continue eliquis for stroke prevention  - BPs are acceptable - continue loprssor 25mg bid, entresto 24-26mg BID for HTN/CHF - continue lipitor 10mg for CAD/HLD/PAD - return in 2 weeks

## 2025-01-07 NOTE — PHYSICAL EXAM
[de-identified] : Neuropathic gait. [de-identified] : 1-2+ pitting edema b/l.  Feet wrapped in bandaging.  Status post 2 right toe amputations. [de-identified] : in wheelchair,

## 2025-01-07 NOTE — HISTORY OF PRESENT ILLNESS
[FreeTextEntry1] : This 81 year-old male former patient of Dr. Baron.  NICM dx ~ 2014, no significant CAD on cath at the time  (distal LCx 80%), HTN, AV block s/p BiV ICD, chronic atrial fibrillation on Eliquis, chronic foot ulcer, PAD, s/p toe amputations, CKD stage 3, dyslipidemia, Type 1 DM, diabetic peripheral neuropathy, acquired hypothyroidism, carotid atherosclerosis, hx of stroke 2014 s/p TPA, TIA 3/19/20, and recent stroke July 2024 with residual L sided weakness.  Eliquis increased to 5mg BID after this most recent stroke.  He was also in hospital/rehab for extended period of time in April/May 2024 for a left femur fracture that required repair and rehab.    He is currently minimally ambulatory due to the L leg weakness and has been dealing with chronic bilateral heel ulcerations requiring regular wound care.  He has a hx of PAD and is scheduled for peripheral angio next week with Dr. Zayas.   He has no complaints of SOB or CP.  He has chronic LE edema which per patient is improving.  He takes lasix 20mg daily.   1/7/24:  since last visit had peipheral angiogram.   noted to have significant SOMMER in december, lasix held.  restarted at lower dose 20mg ~ 1 week ago.  he notes some weight gain.  no new sob.  + leg swelling.

## 2025-01-13 NOTE — END OF VISIT
[Time Spent: ___ minutes] : I have spent [unfilled] minutes of time on the encounter which excludes teaching and separately reported services. [FreeTextEntry3] :  I, Lorenzo Calvo, am scribing for and in the presence of Dr. Milana Jansen in the following sections: HISTORY OF PRESENT ILLNESS; REVIEW OF SYSTEMS; PHYSICAL EXAM; ASSESSMENT/ PLAN.I, Milana Jansen, personally performed the services described in the documentation, reviewed the documentation recorded by the scribe in my presence, and it accurately and completely records my words and actions. 01/13/25

## 2025-01-13 NOTE — HISTORY OF PRESENT ILLNESS
[FreeTextEntry1] : Last Prolia 01/03/25, 6th injection; Prolia 6/17/24, fifth injection 82 yo WM with brittle HIRAM coming for f/u. On Medtronic pump, does not use bolus wizard, does not enter carbs or BG values. as he does not like it, due to his gastroparesis, tried square waves and he feels like his sugars were low at the end of the bolus for dinner  taking Calcium citrate 600mg bid and Vit D3 at 2,000 IU qd on top of Calcitriol, sees Dr Houser , parathyroid much improved had a fall hurt his knee does not exercise any longer   takes Synthroid at 2am when he goes to the bathroom, then goes back to sleep, still has diarrhea on and off, never increased his Synthroid as advised but TFTs better DEXCOM CGM downloaded and reviewed with the pt Medtronic insulin pump downloaded and reviewed with the pt     type 1 DM/HIRAM since age 27, complicated with nephropathy,retinopathy, ++ gastroparesis sees Dr Dominguez , PVD toe amputations  last seen Nutritionist two years ago   had diabetes classes more than 10 yrs ago , not interested to have again  on Medtronic insulin pump for 10yrs, present pump more than a year, with Humalog   Calibrates CGM once daily sometime less despite advised bid  severe hypoglicemia 1974, 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 checks 3-4 times during exercise and takes snacks.   does roughly 3/4 of an exchange for one unit of Humalog  bolusing 2-10 times a day      Microvascular complications:   Nephropathy yes, last EGFR 31 Cr 2.1, + 1 proteins in urine had 24hr urine for proteins with Dr Doran, had more Advil in the last 2 months due to shoulder pain, stopped completelly a week ago per pt   Neuropathy symptoms, denies Dr. Wray seen him today   Retinopathy yes bilateral lasser surgery and Vitrectomy, cataract last eye exam more than a year ago Dr Funez [place] , Date of Exam Performed 10/17/2018  [facility] reviewed   Macrovascular complications:   HTN at goal on Carvedilol , Entresto   CAD/CVA yes, CVA 18 months ago, left ventricular dysfunction, congestive heart failure, paroxysmal atrial fibrillation, valve abnormalities, coronary artery disease, sees Dr Baron regularly   Lipids at goal on Simvastatin 5mg qd with LFT's normal 4/16   last thyroid US 9/17 IMPRESSION: Mildly enlarged and somewhat heterogeneous thyroid without focal masses. on Synthroid 25mcg qd reports compliance   7/8/24 follow up- Pt now has an aide and ambulates w/ a wheelchair after recent femur fracture. Reports high sugars after hospital discharge and rehab, where he was not able to have his own insulin pump.  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 Got Prolia on 6/17/24. Fifth injection. Has had persistent diarrhea x 1 week. Has appointment with Dr. Dominguez upcoming.  Pt changed his primary care to Dr. Palma. He has a left heal wound after hospital admission to [facility]. Follows at [facility] center with Dr. Cuenca.   7/11/2024 had a stroke, left over left side weakness,  Dr. Haley -Neurologist  could not do MRI  choke on calcium ended up in ER  foot ulcer at [facility] while he had femur fracture doing hyperbaric chambe , will see plastic surgeon for possible skin transplant   01/13/25 Patient is doing okay. Accompanied by wife. Patient has done 40 sessions of hyperbaric chamber, which he was doing daily. Finished with hyperbaric chamber sessions. Had 6th Prolia injection on January 3rd 2025.   Car has broken down and is still in the shop.

## 2025-01-13 NOTE — REASON FOR VISIT
[Follow - Up] : a follow-up visit [DM Type 1] : DM Type 1 [Hypercalcemia] : hypercalcemia [Hyperparathyroidism] : hyperparathyroidism [Other___] : [unfilled]

## 2025-01-13 NOTE — PHYSICAL EXAM
[Alert] : alert [Well Nourished] : well nourished [Healthy Appearance] : healthy appearance [No Acute Distress] : no acute distress [Well Developed] : well developed [Normal Voice/Communication] : normal voice communication [Normal Sclera/Conjunctiva] : normal sclera/conjunctiva [No Lid Lag] : no lid lag [Normal Outer Ear/Nose] : the ears and nose were normal in appearance [Normal Hearing] : hearing was normal [Normal Lips/Gums] : the lips and gums were normal [No Respiratory Distress] : no respiratory distress [No Accessory Muscle Use] : no accessory muscle use [Normal Rate and Effort] : normal respiratory rate and effort [No Involuntary Movements] : no involuntary movements were seen [No Tremors] : no tremors [Oriented x3] : oriented to person, place, and time [Kyphosis] : no kyphosis present [Scoliosis] : no scoliosis [de-identified] : Osteonecrosis of the jaw [de-identified] : has pretibial edema b/l , Irregularly irregular Dual-chamber implanted defibrillator, Doctor Anthony, 4/3/2015.  on 2/2022

## 2025-01-28 NOTE — ASSESSMENT
[FreeTextEntry1] : 81M   CVA July 2024 residual L sided weakness worse in leg  Chronic AFIB sCHF (non ischemic) - recovered LV function BiV ICD - generator change 2024 PAD  CAD IDDM  EKG for hx PPM -  biv paced unchanged  Prior extensive records/imaging personally reviewed  - SOMMER with mild worsening on 40mg lasix daily.  Advise taking med eod.  Stop entresto, BPs low/normal range and SOMMER.   - continue eliquis for stroke prevention  -  continue loprssor 25mg bidfor HTN/CHF - continue lipitor 10mg for CAD/HLD/PAD - repeat BMP in 2 weeks

## 2025-01-28 NOTE — PHYSICAL EXAM
[Well Developed] : well developed [Well Nourished] : well nourished [No Acute Distress] : no acute distress [Normal Conjunctiva] : normal conjunctiva [Normal Venous Pressure] : normal venous pressure [No Carotid Bruit] : no carotid bruit [Normal S1, S2] : normal S1, S2 [No Murmur] : no murmur [No Rub] : no rub [No Gallop] : no gallop [Clear Lung Fields] : clear lung fields [Good Air Entry] : good air entry [No Respiratory Distress] : no respiratory distress  [Soft] : abdomen soft [Non Tender] : non-tender [No Masses/organomegaly] : no masses/organomegaly [Normal Bowel Sounds] : normal bowel sounds [No Cyanosis] : no cyanosis [No Clubbing] : no clubbing [No Varicosities] : no varicosities [No Rash] : no rash [Moves all extremities] : moves all extremities [Normal Speech] : normal speech [Alert and Oriented] : alert and oriented [Normal memory] : normal memory [de-identified] : Neuropathic gait. [de-identified] : 1+ pitting edema b/l mildly improved Status post 2 right toe amputations. [de-identified] : in wheelchair,

## 2025-01-28 NOTE — REVIEW OF SYSTEMS
[FreeTextEntry1] :  62 yo F PMH breast cancer( DCIS s/p left mastectomy 1998), former smoker ( quit 1984 social for 4 years) presenting today for some muscle/joint pains and recent tick bite.  [Negative] : Heme/Lymph [FreeTextEntry2] : Chronic fatigue.  Poor sleeping due to nocturia.  Slightly improved. [FreeTextEntry3] : History of bilateral cataract surgery with lens implant.  Diabetic retinopathy.  Laser surgery.  Right vitrectomy. [FreeTextEntry5] : See HPI. [FreeTextEntry6] : Chronic cough and throat clearing. [FreeTextEntry7] : Chronic episodic abdominal discomfort associated with diabetic gastroparesis, although reportedly the diagnosis is unconfirmed..  Episodic constipation. [FreeTextEntry8] : Significant nocturia, every 2 hours at times, somewhat improved.  Daytime frequency.  ED, permanent.  He has a penile implant.  Consultation with Dr. Wilkinson. [FreeTextEntry9] : Improved left shoulder injury.  Venous and arterial insufficiency.  Status post right-sided venous ablation procedure 2023. [de-identified] : History of squamous cell skin cancer.  Chronic lower extremity lesions. [de-identified] : He has a peripheral neuropathy.  Chronic imbalance.

## 2025-01-28 NOTE — CARDIOLOGY SUMMARY
[de-identified] : EKG 6/1/2021.  Underlying atrial fibrillation likely, unconfirmed.  Biventricular pacing with rightward axis.  No significant change. [de-identified] : Lexiscan MIBI 5/6/14. EF 25%. Fixed inferior defect with generalized hypokinesia. No  overt ischemia.\par  Stress test 5/1/15. 4 minutes. Intermediate protocol. [place] stage I. 3.5 METs.  57%. Nondiagnostic ECG (LBBB). \par   [de-identified] : Echo July 2023.  Normal left ventricular chamber size, thickness and overall systolic function.  EF estimated 55%.  Mildly dilated left and right atrium.  Mild MAC and chordal calcification.  Trace MR.  Mild aortic calcification without stenosis.  Trivial AI.  Device lead visualized right heart.  Trace TR.  Mildly elevated estimated PA, 51 mmHg.  Normal RV systolic function.  Compared with 3/30/2021, no significant change.  Echo.  3/30/2021.  A.  F.  Normal LV function and wall motion.  EF 60%.  Dilated atria.  MAC.  Mild or moderate MR.  Aortic sclerosis.  Trace AI.  PA 55.  Echo 8/2/19. A. F. Normal LV function. EF 65%. LVH, 1.3 cm. Dilated left and right atrium.     Calcified chordae tendineae. Moderate MR. Aortic calcification without stenosis. Mild TR.     PA 40 mm of mercury. Similar to 2017. Echo 4/11/2016. Normal LV function. Probable normal wall motion. LVH, 1.2 cm. EF 65%.    Mildly thickened mitral valve, mild MAC. Trace MR. Trivial aortic sclerosis with trace AI.   Gradient 9/4. PA 17-22.   JAMEL 6/4/14. EF 20-25%. Performed pre-cardioversion.  [de-identified] : MUGA 2/23/15. Ischemic cardiomyopathy, EF 33%.\par   [de-identified] : Dual-chamber implanted defibrillator, Doctor Anthony, 4/3/2015. LV lead could not be placed  initially. Re-attempt 7/2015 was successful [employer] ( [employer] ) device\par   [de-identified] : Cardiac catheterization 6/4/14. Distal LCx 80% stenosis, 40% proximal. Medical therapy. LV dysfunction is out of proportion to the degree of CAD. \par   [de-identified] : CT scan chest 1/ Left lower lobe consolidationand associated layering pleural effusion.  Coronary calcifications.\par  PFT 1/13/16. Moderately severe diffusion defect, unchanged from 2014.\par  PFT obtained from 6/30/2014. Minimal obstructive airways disease. Moderate diffusion  defect. Refer to complete report. DLCO 56% predicted. Baseline study. Amiodarone just  started. \par  \par   [de-identified] : Vascular evaluation 3/5/2022.  Greater than 75% stenosis right distal popliteal/proximal tibial peroneal trunk.  Right occluded peroneal artery.  Left occluded peroneal and anterior tibial artery.  Bilateral dorsalis pedis arteries not visualized.  Heavily calcification trifurcation.  Borderline reduced ABIs.\par  Carotid duplex 12/11/2019. Mild plaque bilateral. No change from 2015.\par   [de-identified] : Echo 12/3/24: 1. Left ventricular cavity is normal in size. Left ventricular wall thickness is normal. Left ventricular systolic function is normal with an ejection fraction of 54 % by Acosta's method of disks. There are no regional wall motion abnormalities seen. 2. Moderate asymmetric left ventricular hypertrophy. 3. Unable to assess left ventricular diastolic function due to insufficient data. 4. Moderately enlarged right ventricular cavity size and borderline reduced right ventricular systolic function. 5. Left atrium is moderately dilated. 6. The right atrium is dilated. 7. Probably mild aortic stenosis. 8. Mild mitral regurgitation. 9. Mild tricuspid regurgitation. 10. Estimated pulmonary artery systolic pressure is 59 mmHg, consistent with moderate-to-severe pulmonary hypertension. 11. No pericardial effusion seen. 12. Left pleural effusion noted. 13. Device lead is visualized in the right heart.

## 2025-01-28 NOTE — REASON FOR VISIT
[Follow-Up Visit] : a follow-up visit for [FreeTextEntry2] : HF, SOMMER, afib, ppm  [FreeTextEntry1] : Cerebrovascular event likely secondary to cardiac emboli with thrombolysis, 2014/probable brief TIA with aphasia March 2020 Coronary artery disease with left ventricular dysfunction out of proportion to the degree of disease, improved on therapy. Device therapy/[employer] dual-chamber biventricular pacemaker defibrillator Echo/valve abnormalities Carotid atherosclerosis Type 1 diabetes with chronic kidney disease and additional complications Dyslipidemia Peripheral vascular disease, both arterial and venous.  Status post right-sided venous ablation procedure. Hypertension with episodic orthostatic hypotension.  He has additional medical problems as noted.  His primary care physician is Doctor May Doran.

## 2025-01-28 NOTE — HISTORY OF PRESENT ILLNESS
[FreeTextEntry1] : This 81 year-old male former patient of Dr. Baron.  NICM dx ~ 2014, no significant CAD on cath at the time  (distal LCx 80%), HTN, AV block s/p BiV ICD, chronic atrial fibrillation on Eliquis, chronic foot ulcer, PAD, s/p toe amputations, CKD stage 3, dyslipidemia, Type 1 DM, diabetic peripheral neuropathy, acquired hypothyroidism, carotid atherosclerosis, hx of stroke 2014 s/p TPA, TIA 3/19/20, and recent stroke July 2024 with residual L sided weakness.  Eliquis increased to 5mg BID after this most recent stroke.  He was also in hospital/rehab for extended period of time in April/May 2024 for a left femur fracture that required repair and rehab.    He is currently minimally ambulatory due to the L leg weakness and has been dealing with chronic bilateral heel ulcerations requiring regular wound care.  He has a hx of PAD and is scheduled for peripheral angio next week with Dr. Zayas.   He has no complaints of SOB or CP.  He has chronic LE edema which per patient is improving.  He takes lasix 20mg daily.   1/7/24:  since last visit had peipheral angiogram.   noted to have significant SOMMER in december, lasix held.  restarted at lower dose 20mg ~ 1 week ago.  he notes some weight gain.  no new sob.  + leg swelling.   1/28/25:  weight has come down a little, has been taking 40mg lasix daily since last visit.  leg swelling mildly improved.  no sob.

## 2025-02-14 NOTE — REASON FOR VISIT
[Follow - Up] : a follow-up visit [DM Type 1] : DM Type 1 [Hypercalcemia] : hypercalcemia [Hypothyroidism] : hypothyroidism [Hyperparathyroidism] : hyperparathyroidism [Other___] : [unfilled]

## 2025-02-14 NOTE — PHYSICAL EXAM
[Alert] : alert [Well Nourished] : well nourished [Healthy Appearance] : healthy appearance [No Acute Distress] : no acute distress [Well Developed] : well developed [Normal Voice/Communication] : normal voice communication [Normal Sclera/Conjunctiva] : normal sclera/conjunctiva [No Lid Lag] : no lid lag [Normal Outer Ear/Nose] : the ears and nose were normal in appearance [Normal Hearing] : hearing was normal [Normal Lips/Gums] : the lips and gums were normal [No Respiratory Distress] : no respiratory distress [No Accessory Muscle Use] : no accessory muscle use [Normal Rate and Effort] : normal respiratory rate and effort [No Involuntary Movements] : no involuntary movements were seen [No Tremors] : no tremors [Oriented x3] : oriented to person, place, and time

## 2025-03-13 NOTE — HISTORY OF PRESENT ILLNESS
[FreeTextEntry1] : Don  82 male with longstanding DM ( 50+ yrs), CAD ( dual chamber pacemaker), AFIB, CHF (on entresto, lasix prn), BPH ( 5+ x/night was on finasteride, US at Urologists showed no PVR per pt), HTN , peripheral neuropathy, autonomic dysfunction,  initially referred for elevated cr ( 2-2.2 from 1/2019 to 7/2019) c/w CKD 3 -   3/2025 f/u CKD, ANS dysfunction , periph eema slow recovery following hip # and CVA diff mobilizing fluid, sig bruce le weakness ( deconditioned limiting mobility) has gained ~20 lbs    10/2024 f/u for CKD, autonomic dysfunction - s/p hospitalization for hip #, cva, was n rehab with limited recovery, being seen in wound care by IR, vascular and podiatry for le wounds, contine sto struggle with chronic venous insufficiency - wife states that after going to sleep , horizontally, edema nearly completely resolved by am, but over course of day le edema recures  has bruce foot drop  6/2024 here for f/u s/p hospitalization s/p hip #, ckd, autonomic dysfunction, hypotension, orthoistasis during hospitalization has had extreme diff controlling BP ( orthostatic, autonomic dysfunction), multiple confounding issues ( ie pain, pain meds, sleeping meds, hydration, malnutrition...) continues to have PT legs edematous ( edema had, for the most part, resolved prior to hospitalization with Hip#_ had been taking lasix on prn basis, during hospitalization it was held due to hypotension, hasnt resumed lasix, recommend he resume now, keep legs  elevated repeat labs today check xray   s/p surgical excision of melanoma R forearm - states he will need additional surgery, "too close to margins" s/p venous sclerotherapy R lower leg    3/2023 here for f/u ckd, autonomic dysfunction, hypotension s/p surgical excision of melanoma R forearm - states he will need additional surgery, "too close to margins" s/p venous sclerotherapy R lower leg  12/2023 since last visit, cracked R lower molar, complicated by development of " bone spurs" - was taking Motrin daily chronic pain noted to to be elevated since 10/2023  9/2023 here for f/u -  has since developed venous ulcer - seen by IR, wound care autonomic dysfunction DN pacemaker last labs in 2/2023, cr had improved to 1.6, gfr 43  - 2.18/20's,  8/2023 1.9/GFR  33 Endo, Cards, GI note reviewed   6/2023 here for f/u - returned from 16 day trip to [place], sig weight gain autonomic dysfunction DN pacemaker last labs in 2/2023, cr had improved to 1.6, gfr 43 Endo, Cards, GI note reviewed   12/2022 f/u SOMMER on CKD/CKD 3/4 - was exp great diff walking due to light headed ness/hypotension attributed to autonomic dysfunction along with loss of proprioception from peripheral neuropathy, d/w cardiology, increased heart to 70 with marked improvement in gait - less unsteadiness  Managing as best he can with disease burden.  Overall stable - cr fluctuating between 1.8 and 2.3 since 2019;   Was using florinef for dizziness hypotension - developed edema, so holding at this time -  BPH with nocturia - anywhere from 3-10x/night, improved with glass of wine  3/1/2023 - cr improved from 2.4 to 2.09 to 1.87, more his baseline and as of 2/2023 was 1.62 - fluid management remains an issue given his le edema and underlying heart disease - pth elevated ( seeing Dr. Jansen) - no longer light headed since increasing HR on pacemaker   9/2022 - cr improved from 2.4 to 2.09 to 1.87, more his baseline - fluid management remains an issue given his le edema and underlying heart disease  6/10/2022 - cr improved from 2.4 to 2.09, more his baseline - fluid management remains an issue given his le edema and underlying heart disease  4/2022 - continues to have diff controlling BS, A1c 7's - cr adrienne to highest on record since 2019 with a level of 2.49 in 2/2022,  suspects it may be 2/2 uncontrolled  blood sugars, will be having new pump as per Dr. Jansen

## 2025-03-13 NOTE — ASSESSMENT
[FreeTextEntry1] : Pleasant 83 yo male with longstanding DM ( 50+ yrs), CAD ( dual chamber pacemaker), AFIB, CHF(on entresto, lasix), BPH ( 5+ x/night on finasteride, US at Urologists showed no PVR per pt), required lloyd following hip#, HTN,  peripheral neuropathy, autonomic dysfunction, initially referred for elevated cr ( 2-2.2 from 1/2019 to 7/2019) c/w CKD 3 -    CKD 3 recheck labs today obtain scale with which to measure weight so that diuretics can be prescribed and adjusted safely dep on results pan on resuming combination of loop and thiazide  HTN - elevated today  has been off entresto - awaiting repeat labs to determine if O to resume  Edema - previously lost approx 23 lbs since combining eplerenone and lasix - check bmet ,may resume eplerenone, lasix and thiazide  Anemia - Hgb, iron panel stable  - cont nephrocap, no need for procrit.  Hypotension/orthostasis - consider florinef prn ( developed worsening edema previously) - consider midodrine  Constipation -resolved  BPH - seeing Dr. Wilkinson, debating on GL, ? lloyd temporarily - rx flomax, but will refrain due to risk of hypotension   Cardiology, Endocrine, PCP notes reviewed, hosp notes reveiwed BMET, Hgb A1c, PTH, iron panel , CBC reviewed.

## 2025-03-13 NOTE — PHYSICAL EXAM
[General Appearance - Alert] : alert [General Appearance - In No Acute Distress] : in no acute distress [Sclera] : the sclera and conjunctiva were normal [Extraocular Movements] : extraocular movements were intact [Outer Ear] : the ears and nose were normal in appearance [Hearing Threshold Finger Rub Not [place]] : hearing was normal [Neck Appearance] : the appearance of the neck was normal [] : no respiratory distress [Exaggerated Use Of Accessory Muscles For Inspiration] : no accessory muscle use [Apical Impulse] : the apical impulse was normal [Heart Sounds] : normal S1 and S2 [Bowel Sounds] : normal bowel sounds [Abdomen Soft] : soft [No CVA Tenderness] : no ~M costovertebral angle tenderness [No Spinal Tenderness] : no spinal tenderness [Skin Color & Pigmentation] : normal skin color and pigmentation [Cranial Nerves] : cranial nerves 2-12 were intact [Oriented To Time, Place, And Person] : oriented to person, place, and time [Affect] : the affect was normal [FreeTextEntry1] : unsteady gait

## 2025-03-13 NOTE — PHYSICAL EXAM
[General Appearance - Alert] : alert [General Appearance - In No Acute Distress] : in no acute distress [Sclera] : the sclera and conjunctiva were normal [Extraocular Movements] : extraocular movements were intact [Outer Ear] : the ears and nose were normal in appearance [Hearing Threshold Finger Rub Not [place]] : hearing was normal [Neck Appearance] : the appearance of the neck was normal [] : no respiratory distress [Exaggerated Use Of Accessory Muscles For Inspiration] : no accessory muscle use [Apical Impulse] : the apical impulse was normal [Heart Sounds] : normal S1 and S2 [Bowel Sounds] : normal bowel sounds [Abdomen Soft] : soft [No CVA Tenderness] : no ~M costovertebral angle tenderness [No Spinal Tenderness] : no spinal tenderness [Skin Color & Pigmentation] : normal skin color and pigmentation [Cranial Nerves] : cranial nerves 2-12 were intact [Oriented To Time, Place, And Person] : oriented to person, place, and time [Affect] : the affect was normal [FreeTextEntry1] : unsteady gait

## 2025-03-13 NOTE — ASSESSMENT
[FreeTextEntry1] : Pleasant 81 yo male with longstanding DM ( 50+ yrs), CAD ( dual chamber pacemaker), AFIB, CHF(on entresto, lasix), BPH ( 5+ x/night on finasteride, US at Urologists showed no PVR per pt), required lloyd following hip#, HTN,  peripheral neuropathy, autonomic dysfunction, initially referred for elevated cr ( 2-2.2 from 1/2019 to 7/2019) c/w CKD 3 -    CKD 3 recheck labs today obtain scale with which to measure weight so that diuretics can be prescribed and adjusted safely dep on results pan on resuming combination of loop and thiazide  HTN - elevated today  has been off entresto - awaiting repeat labs to determine if O to resume  Edema - previously lost approx 23 lbs since combining eplerenone and lasix - check bmet ,may resume eplerenone, lasix and thiazide  Anemia - Hgb, iron panel stable  - cont nephrocap, no need for procrit.  Hypotension/orthostasis - consider florinef prn ( developed worsening edema previously) - consider midodrine  Constipation -resolved  BPH - seeing Dr. Wilkinson, debating on GL, ? lloyd temporarily - rx flomax, but will refrain due to risk of hypotension   Cardiology, Endocrine, PCP notes reviewed, hosp notes reveiwed BMET, Hgb A1c, PTH, iron panel , CBC reviewed.

## 2025-03-13 NOTE — PHYSICAL EXAM
[General Appearance - Alert] : alert [Sclera] : the sclera and conjunctiva were normal [General Appearance - In No Acute Distress] : in no acute distress [Extraocular Movements] : extraocular movements were intact [Outer Ear] : the ears and nose were normal in appearance [Hearing Threshold Finger Rub Not [place]] : hearing was normal [Neck Appearance] : the appearance of the neck was normal [] : no respiratory distress [Exaggerated Use Of Accessory Muscles For Inspiration] : no accessory muscle use [Apical Impulse] : the apical impulse was normal [Heart Sounds] : normal S1 and S2 [Bowel Sounds] : normal bowel sounds [Abdomen Soft] : soft [No CVA Tenderness] : no ~M costovertebral angle tenderness [No Spinal Tenderness] : no spinal tenderness [Skin Color & Pigmentation] : normal skin color and pigmentation [Cranial Nerves] : cranial nerves 2-12 were intact [Oriented To Time, Place, And Person] : oriented to person, place, and time [Affect] : the affect was normal [FreeTextEntry1] : unsteady gait

## 2025-03-21 NOTE — HISTORY OF PRESENT ILLNESS
[FreeTextEntry1] : Last Prolia 01/03/25, 6th injection; Prolia 6/17/24, fifth injection 82 yo WM with brittle HIRAM coming for f/u. On Medtronic pump, does not use bolus wizard, does not enter carbs or BG values. as he does not like it, due to his gastroparesis, tried square waves and he feels like his sugars were low at the end of the bolus for dinner  taking Calcium citrate 600mg bid and Vit D3 at 2,000 IU qd on top of Calcitriol, sees Dr Houser , parathyroid much improved had a fall hurt his knee does not exercise any longer   takes Synthroid at 2am when he goes to the bathroom, then goes back to sleep, still has diarrhea on and off, never increased his Synthroid as advised but TFTs better DEXCOM CGM downloaded and reviewed with the pt Medtronic insulin pump downloaded and reviewed with the pt     type 1 DM/HIRAM since age 27, complicated with nephropathy,retinopathy, ++ gastroparesis sees Dr Dominguez , PVD toe amputations  last seen Nutritionist two years ago   had diabetes classes more than 10 yrs ago , not interested to have again  on Medtronic insulin pump for 10yrs, present pump more than a year, with Humalog   Calibrates CGM once daily sometime less despite advised bid  severe hypoglicemia 1974, 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 checks 3-4 times during exercise and takes snacks.   does roughly 3/4 of an exchange for one unit of Humalog  bolusing 2-10 times a day      Microvascular complications:   Nephropathy yes, last EGFR 31 Cr 2.1, + 1 proteins in urine had 24hr urine for proteins with Dr Doran, had more Advil in the last 2 months due to shoulder pain, stopped completelly a week ago per pt   Neuropathy symptoms, denies Dr. Wray seen him today   Retinopathy yes bilateral lasser surgery and Vitrectomy, cataract last eye exam more than a year ago Dr Funez [place] , Date of Exam Performed 10/17/2018  [facility] reviewed   Macrovascular complications:   HTN at goal on Carvedilol , Entresto   CAD/CVA yes, CVA 18 months ago, left ventricular dysfunction, congestive heart failure, paroxysmal atrial fibrillation, valve abnormalities, coronary artery disease, sees Dr Baron regularly   Lipids at goal on Simvastatin 5mg qd with LFT's normal 4/16   last thyroid US 9/17 IMPRESSION: Mildly enlarged and somewhat heterogeneous thyroid without focal masses. on Synthroid 25mcg qd reports compliance   7/8/24 follow up- Pt now has an aide and ambulates w/ a wheelchair after recent femur fracture. Reports high sugars after hospital discharge and rehab, where he was not able to have his own insulin pump.  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 Got Prolia on 6/17/24. Fifth injection. Has had persistent diarrhea x 1 week. Has appointment with Dr. Dominguez upcoming.  Pt changed his primary care to Dr. Palma. He has a left heal wound after hospital admission to [facility]. Follows at [place] wound [facility] with Dr. Cuenca.   7/11/2024 had a stroke, left over left side weakness,  Dr. Haley -Neurologist  could not do MRI  choke on calcium ended up in ER  foot ulcer at [facility] while he had femur fracture doing hyperbaric chambe , will see plastic surgeon for possible skin transplant   01/13/25 Patient is doing okay. Accompanied by wife. Patient has done 40 sessions of hyperbaric chamber, which he was doing daily. Finished with hyperbaric chamber sessions. Had 6th Prolia injection on January 3rd 2025.   Car has broken down and is still in the shop.    02/14/25  He is accompanied by wife. Patient is doing so-so. He had the grafts on his heels and as per pt it is healing well. However, he cannot do PT or shower until he has his follow-up on Tuesday.  Patient complains of being very itchy to the point of not being able to sleep. Wife says it is chronic and he has had it for years. Presents as rashes on skin. Dermatologist gave him Betamethasone valerate 0.1%which worked, and as per patient, is the only thing that's ever worked. Patient states the dermatologist does not know what cause it. I advised that usage longer than a month can make the skin thinner.

## 2025-03-21 NOTE — PHYSICAL EXAM
[Alert] : alert [Well Nourished] : well nourished [Healthy Appearance] : healthy appearance [No Acute Distress] : no acute distress [Well Developed] : well developed [Normal Voice/Communication] : normal voice communication [Normal Sclera/Conjunctiva] : normal sclera/conjunctiva [No Lid Lag] : no lid lag [Normal Outer Ear/Nose] : the ears and nose were normal in appearance [Normal Hearing] : hearing was normal [Normal Lips/Gums] : the lips and gums were normal [No Respiratory Distress] : no respiratory distress [No Accessory Muscle Use] : no accessory muscle use [Normal Rate and Effort] : normal respiratory rate and effort [No Involuntary Movements] : no involuntary movements were seen [No Tremors] : no tremors [Oriented x3] : oriented to person, place, and time [Kyphosis] : no kyphosis present [Scoliosis] : no scoliosis [de-identified] : Osteonecrosis of the jaw [de-identified] : has pretibial edema b/l , Irregularly irregular Dual-chamber implanted defibrillator, Doctor Anthony, 4/3/2015.  on 2/2022

## 2025-03-21 NOTE — PHYSICAL EXAM
[Alert] : alert [Well Nourished] : well nourished [Healthy Appearance] : healthy appearance [No Acute Distress] : no acute distress [Well Developed] : well developed [Normal Voice/Communication] : normal voice communication [Normal Sclera/Conjunctiva] : normal sclera/conjunctiva [No Lid Lag] : no lid lag [Normal Outer Ear/Nose] : the ears and nose were normal in appearance [Normal Hearing] : hearing was normal [Normal Lips/Gums] : the lips and gums were normal [No Respiratory Distress] : no respiratory distress [No Accessory Muscle Use] : no accessory muscle use [Normal Rate and Effort] : normal respiratory rate and effort [No Involuntary Movements] : no involuntary movements were seen [No Tremors] : no tremors [Oriented x3] : oriented to person, place, and time [Kyphosis] : no kyphosis present [Scoliosis] : no scoliosis [de-identified] : Osteonecrosis of the jaw [de-identified] : has pretibial edema b/l , Irregularly irregular Dual-chamber implanted defibrillator, Doctor Anthony, 4/3/2015.  on 2/2022

## 2025-03-21 NOTE — HISTORY OF PRESENT ILLNESS
[FreeTextEntry1] : Last Prolia 01/03/25, 6th injection; Prolia 6/17/24, fifth injection 80 yo WM with brittle HIRAM coming for f/u. On Medtronic pump, does not use bolus wizard, does not enter carbs or BG values. as he does not like it, due to his gastroparesis, tried square waves and he feels like his sugars were low at the end of the bolus for dinner  taking Calcium citrate 600mg bid and Vit D3 at 2,000 IU qd on top of Calcitriol, sees Dr Houser , parathyroid much improved had a fall hurt his knee does not exercise any longer   takes Synthroid at 2am when he goes to the bathroom, then goes back to sleep, still has diarrhea on and off, never increased his Synthroid as advised but TFTs better DEXCOM CGM downloaded and reviewed with the pt Medtronic insulin pump downloaded and reviewed with the pt     type 1 DM/HIRAM since age 27, complicated with nephropathy,retinopathy, ++ gastroparesis sees Dr Dominguez , PVD toe amputations  last seen Nutritionist two years ago   had diabetes classes more than 10 yrs ago , not interested to have again  on Medtronic insulin pump for 10yrs, present pump more than a year, with Humalog   Calibrates CGM once daily sometime less despite advised bid  severe hypoglicemia 1974, 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 checks 3-4 times during exercise and takes snacks.   does roughly 3/4 of an exchange for one unit of Humalog  bolusing 2-10 times a day      Microvascular complications:   Nephropathy yes, last EGFR 31 Cr 2.1, + 1 proteins in urine had 24hr urine for proteins with Dr Doran, had more Advil in the last 2 months due to shoulder pain, stopped completelly a week ago per pt   Neuropathy symptoms, denies Dr. Wray seen him today   Retinopathy yes bilateral lasser surgery and Vitrectomy, cataract last eye exam more than a year ago Dr Funez [place] , Date of Exam Performed 10/17/2018  [facility] reviewed   Macrovascular complications:   HTN at goal on Carvedilol , Entresto   CAD/CVA yes, CVA 18 months ago, left ventricular dysfunction, congestive heart failure, paroxysmal atrial fibrillation, valve abnormalities, coronary artery disease, sees Dr Baron regularly   Lipids at goal on Simvastatin 5mg qd with LFT's normal 4/16   last thyroid US 9/17 IMPRESSION: Mildly enlarged and somewhat heterogeneous thyroid without focal masses. on Synthroid 25mcg qd reports compliance   7/8/24 follow up- Pt now has an aide and ambulates w/ a wheelchair after recent femur fracture. Reports high sugars after hospital discharge and rehab, where he was not able to have his own insulin pump.  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 Got Prolia on 6/17/24. Fifth injection. Has had persistent diarrhea x 1 week. Has appointment with Dr. Dominguez upcoming.  Pt changed his primary care to Dr. Palma. He has a left heal wound after hospital admission to [facility]. Follows at [place] wound center with Dr. Cuenca.   7/11/2024 had a stroke, left over left side weakness,  Dr. Haley -Neurologist  could not do MRI  choke on calcium ended up in ER  foot ulcer at [facility] while he had femur fracture doing hyperbaric chambe , will see plastic surgeon for possible skin transplant   01/13/25 Patient is doing okay. Accompanied by wife. Patient has done 40 sessions of hyperbaric chamber, which he was doing daily. Finished with hyperbaric chamber sessions. Had 6th Prolia injection on January 3rd 2025.   Car has broken down and is still in the shop.    02/14/25  He is accompanied by wife. Patient is doing so-so. He had the grafts on his heels and as per pt it is healing well. However, he cannot do PT or shower until he has his follow-up on Tuesday.  Patient complains of being very itchy to the point of not being able to sleep. Wife says it is chronic and he has had it for years. Presents as rashes on skin. Dermatologist gave him Betamethasone valerate 0.1%which worked, and as per patient, is the only thing that's ever worked. Patient states the dermatologist does not know what cause it. I advised that usage longer than a month can make the skin thinner.

## 2025-04-22 NOTE — ASSESSMENT
[FreeTextEntry1] : 81M   Orthostatic Hypotension CVA July 2024 residual L sided weakness worse in leg  Chronic AFIB sCHF (non ischemic) - recovered LV function BiV ICD - generator change 2024 PAD  CAD IDDM  EKG for hx PPM/htn -  biv paced unchanged  Prior extensive records/imaging personally reviewed discharged 4/15/25.  - continue lasix 20mg daily, leg swelling and weight gain noted.  He had been off diuretics for period of time during his admission.  Counseled on close monitoring of weight, swelling, and for respiratory complaints.  His lungs are clear on exam and he has no sob.   Low threshold to increase regimen.  - continue eliquis for stroke prevention, lower to 2.5mg bid given age/renal failure -  continue loprssor 50mg bidfor HTN/CHF.  BPs acceptable  - off entresto due to margaret/orthostatic hypotension - continue pyridostigmine 30mg TID for orthostatic hypotension, has helped significantly  - continue lipitor 10mg for CAD/HLD/PAD - compress and elevated legs when possible, ambulate as tolerated

## 2025-04-22 NOTE — HISTORY OF PRESENT ILLNESS
[FreeTextEntry1] : This 81 year-old male former patient of Dr. Baron.  NICM dx ~ 2014, no significant CAD on cath at the time  (distal LCx 80%), HTN, AV block s/p BiV ICD, chronic atrial fibrillation on Eliquis, chronic foot ulcer, PAD, s/p toe amputations, CKD stage 3, dyslipidemia, Type 1 DM, diabetic peripheral neuropathy, acquired hypothyroidism, carotid atherosclerosis, hx of stroke 2014 s/p TPA, TIA 3/19/20, and recent stroke July 2024 with residual L sided weakness.  Eliquis increased to 5mg BID after this most recent stroke.  He was also in hospital/rehab for extended period of time in April/May 2024 for a left femur fracture that required repair and rehab.    He is currently minimally ambulatory due to the L leg weakness and has been dealing with chronic bilateral heel ulcerations requiring regular wound care.  He has a hx of PAD and is scheduled for peripheral angio next week with Dr. Zayas.   He has no complaints of SOB or CP.  He has chronic LE edema which per patient is improving.  He takes lasix 20mg daily.   1/7/24:  since last visit had peipheral angiogram.   noted to have significant SOMMER in december, lasix held.  restarted at lower dose 20mg ~ 1 week ago.  he notes some weight gain.  no new sob.  + leg swelling.   1/28/25:  weight has come down a little, has been taking 40mg lasix daily since last visit.  leg swelling mildly improved.  no sob.   4/22/25: recent extended hospitalization for osteomyelitis heel wounds, heart failure, orthostatic hypotension, uncontrolled htn discharged on 4/15/25.  Started on pyridostigmine with some benefit in orthostasis.  Since dc we increased his lopressor due to hypertension.  He has had very little dizziness since being home.  Leg swelling still present.  No sob.  Taking lasix 20mg daily.

## 2025-04-22 NOTE — PHYSICAL EXAM
[Well Developed] : well developed [Well Nourished] : well nourished [No Acute Distress] : no acute distress [Normal Conjunctiva] : normal conjunctiva [Normal Venous Pressure] : normal venous pressure [No Carotid Bruit] : no carotid bruit [Normal S1, S2] : normal S1, S2 [No Murmur] : no murmur [No Rub] : no rub [No Gallop] : no gallop [Clear Lung Fields] : clear lung fields [Good Air Entry] : good air entry [No Respiratory Distress] : no respiratory distress  [Soft] : abdomen soft [Non Tender] : non-tender [No Masses/organomegaly] : no masses/organomegaly [Normal Bowel Sounds] : normal bowel sounds [No Cyanosis] : no cyanosis [No Clubbing] : no clubbing [No Varicosities] : no varicosities [No Rash] : no rash [Moves all extremities] : moves all extremities [Normal Speech] : normal speech [Alert and Oriented] : alert and oriented [Normal memory] : normal memory [de-identified] : 1-2+ pitting edema chronic stasis changes, heel ulcers  [de-identified] : in wheelchair,

## 2025-04-22 NOTE — CARDIOLOGY SUMMARY
[de-identified] : EKG 6/1/2021.  Underlying atrial fibrillation likely, unconfirmed.  Biventricular pacing with rightward axis.  No significant change. [de-identified] : Lexiscan MIBI 5/6/14. EF 25%. Fixed inferior defect with generalized hypokinesia. No  overt ischemia.\par  Stress test 5/1/15. 4 minutes. Intermediate protocol. [place] stage I. 3.5 METs.  57%. Nondiagnostic ECG (LBBB). \par   [de-identified] : Echo 4/3/25: 1. Left ventricular cavity is normal in size. Left ventricular wall thickness is normal. Left ventricular systolic function is mildly decreased with an ejection fraction visually estimated at 45 to 50 %. Global left ventricular hypokinesis. Basal/mid inferolateral, basal inferior wall appear most hypokinetic. 2. Unable to assess left ventricular diastolic function due to insufficient data. 3. Enlarged right ventricular cavity size, with normal wall thickness, and borderline reduced right ventricular systolic function. 4. Left atrium is severely dilated. 5. No significant valvular disease. 6. Mild to moderate mitral regurgitation. 7. Mild tricuspid regurgitation. 8. Estimated pulmonary artery systolic pressure is 33 mmHg, consistent with normal pulmonary artery pressure. 9. The inferior vena cava is normal in size (normal <2.1cm) with normal inspiratory collapse (normal >50%) consistent with normal right atrial pressure ( R 3, range 0-5mmHg). 10. No pericardial effusion seen. 11. Left pleural effusion noted. 12. Device lead is visualized in the right heart. 13. Compared to prior study 12/3/2024 LVEF has mildly declined.  Echo July 2023.  Normal left ventricular chamber size, thickness and overall systolic function.  EF estimated 55%.  Mildly dilated left and right atrium.  Mild MAC and chordal calcification.  Trace MR.  Mild aortic calcification without stenosis.  Trivial AI.  Device lead visualized right heart.  Trace TR.  Mildly elevated estimated PA, 51 mmHg.  Normal RV systolic function.  Compared with 3/30/2021, no significant change.  Echo.  3/30/2021.  A.  F.  Normal LV function and wall motion.  EF 60%.  Dilated atria.  MAC.  Mild or moderate MR.  Aortic sclerosis.  Trace AI.  PA 55.  Echo 8/2/19. A. F. Normal LV function. EF 65%. LVH, 1.3 cm. Dilated left and right atrium.     Calcified chordae tendineae. Moderate MR. Aortic calcification without stenosis. Mild TR.     PA 40 mm of mercury. Similar to 2017. Echo 4/11/2016. Normal LV function. Probable normal wall motion. LVH, 1.2 cm. EF 65%.    Mildly thickened mitral valve, mild MAC. Trace MR. Trivial aortic sclerosis with trace AI.   Gradient 9/4. PA 17-22.   JAMEL 6/4/14. EF 20-25%. Performed pre-cardioversion.  [de-identified] : MUGA 2/23/15. Ischemic cardiomyopathy, EF 33%.\par   [de-identified] : Dual-chamber implanted defibrillator, Doctor Anthony, 4/3/2015. LV lead could not be placed  initially. Re-attempt 7/2015 was successful [employer] ( [employer]ant ) device\par   [de-identified] : Cardiac catheterization 6/4/14. Distal LCx 80% stenosis, 40% proximal. Medical therapy. LV dysfunction is out of proportion to the degree of CAD. \par   [de-identified] : Vascular evaluation 3/5/2022.  Greater than 75% stenosis right distal popliteal/proximal tibial peroneal trunk.  Right occluded peroneal artery.  Left occluded peroneal and anterior tibial artery.  Bilateral dorsalis pedis arteries not visualized.  Heavily calcification trifurcation.  Borderline reduced ABIs.\par  Carotid duplex 12/11/2019. Mild plaque bilateral. No change from 2015.\par   [de-identified] : CT scan chest 1/ Left lower lobe consolidationand associated layering pleural effusion.  Coronary calcifications.\par  PFT 1/13/16. Moderately severe diffusion defect, unchanged from 2014.\par  PFT obtained from 6/30/2014. Minimal obstructive airways disease. Moderate diffusion  defect. Refer to complete report. DLCO 56% predicted. Baseline study. Amiodarone just  started. \par  \par   [de-identified] : Echo 12/3/24: 1. Left ventricular cavity is normal in size. Left ventricular wall thickness is normal. Left ventricular systolic function is normal with an ejection fraction of 54 % by Acosta's method of disks. There are no regional wall motion abnormalities seen. 2. Moderate asymmetric left ventricular hypertrophy. 3. Unable to assess left ventricular diastolic function due to insufficient data. 4. Moderately enlarged right ventricular cavity size and borderline reduced right ventricular systolic function. 5. Left atrium is moderately dilated. 6. The right atrium is dilated. 7. Probably mild aortic stenosis. 8. Mild mitral regurgitation. 9. Mild tricuspid regurgitation. 10. Estimated pulmonary artery systolic pressure is 59 mmHg, consistent with moderate-to-severe pulmonary hypertension. 11. No pericardial effusion seen. 12. Left pleural effusion noted. 13. Device lead is visualized in the right heart.

## 2025-04-22 NOTE — REVIEW OF SYSTEMS
[Negative] : Heme/Lymph [FreeTextEntry2] : Chronic fatigue.  Poor sleeping due to nocturia.  Slightly improved. [FreeTextEntry3] : History of bilateral cataract surgery with lens implant.  Diabetic retinopathy.  Laser surgery.  Right vitrectomy. [FreeTextEntry5] : See HPI. [FreeTextEntry6] : Chronic cough and throat clearing. [FreeTextEntry7] : Chronic episodic abdominal discomfort associated with diabetic gastroparesis, although reportedly the diagnosis is unconfirmed..  Episodic constipation. [FreeTextEntry8] : Significant nocturia, every 2 hours at times, somewhat improved.  Daytime frequency.  ED, permanent.  He has a penile implant.  Consultation with Dr. Wilkinson. [FreeTextEntry9] : Improved left shoulder injury.  Venous and arterial insufficiency.  Status post right-sided venous ablation procedure 2023. [de-identified] : History of squamous cell skin cancer.  Chronic lower extremity lesions. [de-identified] : He has a peripheral neuropathy.  Chronic imbalance.

## 2025-04-22 NOTE — REASON FOR VISIT
[Follow-Up Visit] : a follow-up visit for [FreeTextEntry2] : HF, SOMMER, afib, ppm  [FreeTextEntry1] : Cerebrovascular event likely secondary to cardiac emboli with thrombolysis, 2014/probable brief TIA with aphasia March 2020 Coronary artery disease with left ventricular dysfunction out of proportion to the degree of disease, improved on therapy. Device therapy/[employer] dual-chamber biventricular pacemaker defibrillator Echo/valve abnormalities Carotid atherosclerosis Type 1 diabetes with chronic kidney disease and additional complications Dyslipidemia Peripheral vascular disease, both arterial and venous.  Status post right-sided venous ablation procedure. Hypertension with episodic orthostatic hypotension.  He has additional medical problems as noted.  His primary care physician is Doctor May Doran.

## 2025-05-02 NOTE — REASON FOR VISIT
[Follow - Up] : a follow-up visit [DM Type 1] : DM Type 1 [Hypercalcemia] : hypercalcemia [Hypothyroidism] : hypothyroidism [Hyperparathyroidism] : hyperparathyroidism [Other___] : [unfilled] [Family Member] : family member

## 2025-05-02 NOTE — PHYSICAL EXAM
[Alert] : alert [Well Nourished] : well nourished [Healthy Appearance] : healthy appearance [No Acute Distress] : no acute distress [Well Developed] : well developed [Normal Voice/Communication] : normal voice communication [Normal Sclera/Conjunctiva] : normal sclera/conjunctiva [No Lid Lag] : no lid lag [Normal Outer Ear/Nose] : the ears and nose were normal in appearance [Normal Hearing] : hearing was normal [Normal Lips/Gums] : the lips and gums were normal [No Respiratory Distress] : no respiratory distress [No Accessory Muscle Use] : no accessory muscle use [Normal Rate and Effort] : normal respiratory rate and effort [No Involuntary Movements] : no involuntary movements were seen [No Tremors] : no tremors [Oriented x3] : oriented to person, place, and time [Kyphosis] : no kyphosis present [Scoliosis] : no scoliosis [de-identified] : Osteonecrosis of the jaw [de-identified] : has pretibial edema b/l , Irregularly irregular Dual-chamber implanted defibrillator, Doctor Anthony, 4/3/2015.  on 2/2022

## 2025-05-02 NOTE — END OF VISIT
Detail Level: Zone [Time Spent: ___ minutes] : I have spent [unfilled] minutes of time on the encounter which excludes teaching and separately reported services. Quality 474: Zoster Vaccination Status: Shingrix Vaccination not Administered or Previously Received, Reason not Otherwise Specified [FreeTextEntry3] :  I, Lauryn Zavala, am scribing for and in the presence of Dr. Milana Jansen in the following sections: HISTORY OF PRESENT ILLNESS; REVIEW OF SYSTEMS; PHYSICAL EXAM; ASSESSMENT/ PLAN. I, Milana Jansen, personally performed the services described in the documentation, reviewed the documentation recorded by the scribe in my presence, and it accurately and completely records my words and actions. 05/02/25 Quality 130: Documentation Of Current Medications In The Medical Record: Current Medications Documented Quality 131: Pain Assessment And Follow-Up: Pain assessment using a standardized tool is documented as negative, no follow-up plan required

## 2025-05-02 NOTE — HISTORY OF PRESENT ILLNESS
[FreeTextEntry1] : Last Prolia 01/03/25, 6th injection; Prolia 6/17/24, fifth injection 81 yo WM with brittle HIRAM coming for f/u. On Medtronic pump, does not use bolus wizard, does not enter carbs or BG values. as he does not like it, due to his gastroparesis, tried square waves and he feels like his sugars were low at the end of the bolus for dinner  taking Calcium citrate 600mg bid and Vit D3 at 2,000 IU qd on top of Calcitriol, sees Dr Houser , parathyroid much improved had a fall hurt his knee does not exercise any longer   takes Synthroid at 2am when he goes to the bathroom, then goes back to sleep, still has diarrhea on and off, never increased his Synthroid as advised but TFTs better DEXCOM CGM downloaded and reviewed with the pt Medtronic insulin pump downloaded and reviewed with the pt     type 1 DM/HIRAM since age 27, complicated with nephropathy,retinopathy, ++ gastroparesis sees Dr Dominguez , PVD toe amputations  last seen Nutritionist two years ago   had diabetes classes more than 10 yrs ago , not interested to have again  on Medtronic insulin pump for 10yrs, present pump more than a year, with Humalog   Calibrates CGM once daily sometime less despite advised bid  severe hypoglicemia 1974, 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 checks 3-4 times during exercise and takes snacks.   does roughly 3/4 of an exchange for one unit of Humalog  bolusing 2-10 times a day      Microvascular complications:   Nephropathy yes, last EGFR 31 Cr 2.1, + 1 proteins in urine had 24hr urine for proteins with Dr Doran, had more Advil in the last 2 months due to shoulder pain, stopped completelly a week ago per pt   Neuropathy symptoms, denies Dr. Wray seen him today   Retinopathy yes bilateral lasser surgery and Vitrectomy, cataract last eye exam more than a year ago Dr Funez [place] , Date of Exam Performed 10/17/2018  [facility] reviewed   Macrovascular complications:   HTN at goal on Carvedilol , Entresto   CAD/CVA yes, CVA 18 months ago, left ventricular dysfunction, congestive heart failure, paroxysmal atrial fibrillation, valve abnormalities, coronary artery disease, sees Dr Baron regularly   Lipids at goal on Simvastatin 5mg qd with LFT's normal 4/16   last thyroid US 9/17 IMPRESSION: Mildly enlarged and somewhat heterogeneous thyroid without focal masses. on Synthroid 25mcg qd reports compliance   7/8/24 follow up- Pt now has an aide and ambulates w/ a wheelchair after recent femur fracture. Reports high sugars after hospital discharge and rehab, where he was not able to have his own insulin pump.  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 Got Prolia on 6/17/24. Fifth injection. Has had persistent diarrhea x 1 week. Has appointment with Dr. Dominguez upcoming.  Pt changed his primary care to Dr. Palma. He has a left heal wound after hospital admission to [facility]. Follows at [place] wound center with Dr. Cuenca.   7/11/2024 had a stroke, left over left side weakness,  Dr. Haley -Neurologist  could not do MRI  choke on calcium ended up in ER  foot ulcer at [facility] while he had femur fracture doing hyperbaric chambe , will see plastic surgeon for possible skin transplant   01/13/25 Patient is doing okay. Accompanied by wife. Patient has done 40 sessions of hyperbaric chamber, which he was doing daily. Finished with hyperbaric chamber sessions. Had 6th Prolia injection on January 3rd 2025.   Car has broken down and is still in the shop.    02/14/25  He is accompanied by wife. Patient is doing so-so. He had the grafts on his heels and as per pt it is healing well. However, he cannot do PT or shower until he has his follow-up on Tuesday.  Patient complains of being very itchy to the point of not being able to sleep. Wife says it is chronic and he has had it for years. Presents as rashes on skin. Dermatologist gave him Betamethasone valerate 0.1%which worked, and as per patient, is the only thing that's ever worked. Patient states the dermatologist does not know what cause it. I advised that usage longer than a month can make the skin thinner.   05/02/25 Patient is doing okay. He is accompanied by his wife. He is overdue for Bone Density, last done 2002.  Surgery on right foot, per pt they suspected there was osteomyelitis in the bone but was ruled out. No problems in either tendon. Right wound is healing well per pt. Left wound is still having problems and states it is not due to anything that can be identified. Per wife they did not open blood flow at the time. He follows with Dr. Castro. He did 40 sessions of hyperbaric sessions.  Per wife he is doing physical therapy at home.  eGFR is low but stable, 31. Patient feels is accumulating too much water. Cardiologist does not limit water, patient states he told him to stay hydrated, per wife he drinks Snapple Iced Tea. Reinforced important of staying hydrated as previously advised.   Reviewed with patient US Thyroid done March 2022. IMPRESSION: No change in mild diffuse heterogeneity of thyroid parenchymal echogenicity, without discrete focal nodules evident.

## 2025-06-19 NOTE — HISTORY OF PRESENT ILLNESS
[FreeTextEntry1] : 82M former patient of Dr. Baron.  NICM dx ~ 2014, no significant CAD on cath at the time  (distal LCx 80%), HTN, AV block s/p BiV ICD, chronic atrial fibrillation on Eliquis, chronic foot ulcer, PAD, s/p toe amputations, CKD stage 3, dyslipidemia, Type 1 DM, diabetic peripheral neuropathy, acquired hypothyroidism, carotid atherosclerosis, hx of stroke 2014 s/p TPA, TIA 3/19/20, and recent stroke July 2024 with residual L sided weakness.  Eliquis increased to 5mg BID after this most recent stroke.  He was also in hospital/rehab for extended period of time in April/May 2024 for a left femur fracture that required repair and rehab.    He is currently minimally ambulatory due to the L leg weakness and has been dealing with chronic bilateral heel ulcerations requiring regular wound care.  He has a hx of PAD and is scheduled for peripheral angio next week with Dr. Zayas.   He has no complaints of SOB or CP.  He has chronic LE edema which per patient is improving.  He takes lasix 20mg daily.   1/7/24:  since last visit had peipheral angiogram.   noted to have significant SOMMER in december, lasix held.  restarted at lower dose 20mg ~ 1 week ago.  he notes some weight gain.  no new sob.  + leg swelling.   1/28/25:  weight has come down a little, has been taking 40mg lasix daily since last visit.  leg swelling mildly improved.  no sob.   4/22/25: recent extended hospitalization for osteomyelitis heel wounds, heart failure, orthostatic hypotension, uncontrolled htn discharged on 4/15/25.  Started on pyridostigmine with some benefit in orthostasis.  Since dc we increased his lopressor due to hypertension.  He has had very little dizziness since being home.  Leg swelling still present.  No sob.  Taking lasix 20mg daily.   6/19/25: since last visit patient presented to [place] on 5/31 with complaint of inability to speak as well as increased left sided weakness- Seen By Neuro- EEG with possible temp lobe seizures started on Vimpat, unable to obtain MRI  Hospital course complicated by SOMMER on CKD 2nd Contrast for scans and antibiotics- - transferred to acute rehab and discharged on 6/12/25.  Since discharge his main concern is his leg swelling.   Making it difficult to ambulate.   + BLACKBURN.  Coughs when he changes positions.  No palps. No chest pain.   He has been taking lasix 20mg daily since dc.  It had been held throughout the ~ 2 week hospital stay.

## 2025-06-19 NOTE — PHYSICAL EXAM
[Well Developed] : well developed [Well Nourished] : well nourished [No Acute Distress] : no acute distress [Normal Conjunctiva] : normal conjunctiva [Normal Venous Pressure] : normal venous pressure [No Carotid Bruit] : no carotid bruit [Normal S1, S2] : normal S1, S2 [No Murmur] : no murmur [No Rub] : no rub [No Gallop] : no gallop [Clear Lung Fields] : clear lung fields [Good Air Entry] : good air entry [No Respiratory Distress] : no respiratory distress  [Soft] : abdomen soft [Non Tender] : non-tender [No Masses/organomegaly] : no masses/organomegaly [Normal Bowel Sounds] : normal bowel sounds [No Cyanosis] : no cyanosis [No Clubbing] : no clubbing [No Varicosities] : no varicosities [No Rash] : no rash [Moves all extremities] : moves all extremities [Normal Speech] : normal speech [Alert and Oriented] : alert and oriented [Normal memory] : normal memory [de-identified] : 2+ pitting edema chronic stasis changes, heel ulcers  [de-identified] : in wheelchair,

## 2025-06-19 NOTE — ASSESSMENT
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   Complicated by SOMMER on CKD thought to be from EDYTA.   Now with increased LE edema.    - check renal function today.  He does have marked LE swelling up into thigh and scrotal area.  He had some mild pleural effusions on CXR. He does need to be on diuretics.  Pending result will decide on regimen.  Currently taking lasix 20mg daily for the past week since dc.   - continue eliquis for stroke prevention, lower to 2.5mg bid given age/renal failure -  continue lopressor 25mg bid for HTN/CHF.    - off entresto due to sommer/orthostatic hypotension, started on low dose norvasc 2.5mg during hospital stay - continue pyridostigmine 30mg TID for orthostatic hypotension, has helped significantly  - continue lipitor 10mg for CAD/HLD/PAD - compress and elevated legs when possible, ambulate as tolerated

## 2025-06-19 NOTE — REVIEW OF SYSTEMS
[Negative] : Heme/Lymph [FreeTextEntry2] : Chronic fatigue.  Poor sleeping due to nocturia.  Slightly improved. [FreeTextEntry3] : History of bilateral cataract surgery with lens implant.  Diabetic retinopathy.  Laser surgery.  Right vitrectomy. [FreeTextEntry5] : See HPI. [FreeTextEntry6] : Chronic cough and throat clearing. [FreeTextEntry7] : Chronic episodic abdominal discomfort associated with diabetic gastroparesis, although reportedly the diagnosis is unconfirmed..  Episodic constipation. [FreeTextEntry8] : Significant nocturia, every 2 hours at times, somewhat improved.  Daytime frequency.  ED, permanent.  He has a penile implant.  Consultation with Dr. Wilkinson. [FreeTextEntry9] : Improved left shoulder injury.  Venous and arterial insufficiency.  Status post right-sided venous ablation procedure 2023. [de-identified] : History of squamous cell skin cancer.  Chronic lower extremity lesions. [de-identified] : He has a peripheral neuropathy.  Chronic imbalance.

## 2025-06-19 NOTE — CARDIOLOGY SUMMARY
[de-identified] : EKG 6/1/2021.  Underlying atrial fibrillation likely, unconfirmed.  Biventricular pacing with rightward axis.  No significant change. [de-identified] : Lexiscan MIBI 5/6/14. EF 25%. Fixed inferior defect with generalized hypokinesia. No  overt ischemia.\par  Stress test 5/1/15. 4 minutes. Intermediate protocol. [place] stage I. 3.5 METs.  57%. Nondiagnostic ECG (LBBB). \par   [de-identified] : Echo 4/3/25: 1. Left ventricular cavity is normal in size. Left ventricular wall thickness is normal. Left ventricular systolic function is mildly decreased with an ejection fraction visually estimated at 45 to 50 %. Global left ventricular hypokinesis. Basal/mid inferolateral, basal inferior wall appear most hypokinetic. 2. Unable to assess left ventricular diastolic function due to insufficient data. 3. Enlarged right ventricular cavity size, with normal wall thickness, and borderline reduced right ventricular systolic function. 4. Left atrium is severely dilated. 5. No significant valvular disease. 6. Mild to moderate mitral regurgitation. 7. Mild tricuspid regurgitation. 8. Estimated pulmonary artery systolic pressure is 33 mmHg, consistent with normal pulmonary artery pressure. 9. The inferior vena cava is normal in size (normal <2.1cm) with normal inspiratory collapse (normal >50%) consistent with normal right atrial pressure ( R 3, range 0-5mmHg). 10. No pericardial effusion seen. 11. Left pleural effusion noted. 12. Device lead is visualized in the right heart. 13. Compared to prior study 12/3/2024 LVEF has mildly declined.  Echo July 2023.  Normal left ventricular chamber size, thickness and overall systolic function.  EF estimated 55%.  Mildly dilated left and right atrium.  Mild MAC and chordal calcification.  Trace MR.  Mild aortic calcification without stenosis.  Trivial AI.  Device lead visualized right heart.  Trace TR.  Mildly elevated estimated PA, 51 mmHg.  Normal RV systolic function.  Compared with 3/30/2021, no significant change.  Echo.  3/30/2021.  A.  F.  Normal LV function and wall motion.  EF 60%.  Dilated atria.  MAC.  Mild or moderate MR.  Aortic sclerosis.  Trace AI.  PA 55.  Echo 8/2/19. A. F. Normal LV function. EF 65%. LVH, 1.3 cm. Dilated left and right atrium.     Calcified chordae tendineae. Moderate MR. Aortic calcification without stenosis. Mild TR.     PA 40 mm of mercury. Similar to 2017. Echo 4/11/2016. Normal LV function. Probable normal wall motion. LVH, 1.2 cm. EF 65%.    Mildly thickened mitral valve, mild MAC. Trace MR. Trivial aortic sclerosis with trace AI.   Gradient 9/4. PA 17-22.   JAMEL 6/4/14. EF 20-25%. Performed pre-cardioversion.  [de-identified] : MUGA 2/23/15. Ischemic cardiomyopathy, EF 33%.\par   [de-identified] : Dual-chamber implanted defibrillator, Doctor Anthony, 4/3/2015. LV lead could not be placed  initially. Re-attempt 7/2015 was successful [employer] ( [employer] ) device\par   [de-identified] : Cardiac catheterization 6/4/14. Distal LCx 80% stenosis, 40% proximal. Medical therapy. LV dysfunction is out of proportion to the degree of CAD. \par   [de-identified] : Vascular evaluation 3/5/2022.  Greater than 75% stenosis right distal popliteal/proximal tibial peroneal trunk.  Right occluded peroneal artery.  Left occluded peroneal and anterior tibial artery.  Bilateral dorsalis pedis arteries not visualized.  Heavily calcification trifurcation.  Borderline reduced ABIs.\par  Carotid duplex 12/11/2019. Mild plaque bilateral. No change from 2015.\par   [de-identified] : CT scan chest 1/ Left lower lobe consolidationand associated layering pleural effusion.  Coronary calcifications.\par  PFT 1/13/16. Moderately severe diffusion defect, unchanged from 2014.\par  PFT obtained from 6/30/2014. Minimal obstructive airways disease. Moderate diffusion  defect. Refer to complete report. DLCO 56% predicted. Baseline study. Amiodarone just  started. \par  \par   [de-identified] : Echo 12/3/24: 1. Left ventricular cavity is normal in size. Left ventricular wall thickness is normal. Left ventricular systolic function is normal with an ejection fraction of 54 % by Acosta's method of disks. There are no regional wall motion abnormalities seen. 2. Moderate asymmetric left ventricular hypertrophy. 3. Unable to assess left ventricular diastolic function due to insufficient data. 4. Moderately enlarged right ventricular cavity size and borderline reduced right ventricular systolic function. 5. Left atrium is moderately dilated. 6. The right atrium is dilated. 7. Probably mild aortic stenosis. 8. Mild mitral regurgitation. 9. Mild tricuspid regurgitation. 10. Estimated pulmonary artery systolic pressure is 59 mmHg, consistent with moderate-to-severe pulmonary hypertension. 11. No pericardial effusion seen. 12. Left pleural effusion noted. 13. Device lead is visualized in the right heart.

## 2025-06-19 NOTE — REASON FOR VISIT
[Follow-Up Visit] : a follow-up visit for [FreeTextEntry2] : HF, SOMMER, afib, ppm  [FreeTextEntry1] : Cerebrovascular event likely secondary to cardiac emboli with thrombolysis, 2014/probable brief TIA with aphasia March 2020 Coronary artery disease with left ventricular dysfunction out of proportion to the degree of disease, improved on therapy. Device therapy/[employer] dual-chamber biventricular pacemaker defibrillator Echo/valve abnormalities Carotid atherosclerosis Type 1 diabetes with chronic kidney disease and additional complications Dyslipidemia Peripheral vascular disease, both arterial and venous.  Status post right-sided venous ablation procedure. Hypertension with episodic orthostatic hypotension.  He has additional medical problems as noted.  His primary care physician is Doctor May Doran.

## 2025-06-24 NOTE — HISTORY OF PRESENT ILLNESS
[Post-hospitalization from ___ Hospital] : Post-hospitalization from [unfilled] Hospital [Admitted on: ___] : The patient was admitted on [unfilled] [Discharged on ___] : discharged on [unfilled] [FreeTextEntry2] : Diagnosis was CVA Left Hemiparesis with decline, new onset aphasia 2nd CVA Vs Seizure Vs Met encephalopathy  CVA Left Hemiparesis with decline, new onset aphasia 2nd CVA Vs Seizure Vs Met encephalopathy history of DM2, HTN, HLD, Afib on Eliquis (2.5mg), heart failure - has PPM/defibrillator which is not MRI compatible, Gait instability/falls, peripheral neuropathy, orthostatic hypotension - on Midodrine prn but has only had to take it once, L hip fracture s/p L ORIF, prior L MCA stroke 2014 (p/w aphasia which has now resolved) s/p tPA--no residual deficits, prior R posterior limb internal capsule stroke in 07/2024 with residual L facial, dysarthria, LUE ataxia, LUE/LLE hemiparesis, and b/l heel non-healing pressure ulcers who presented to [facility] on 5/31 with complaint of inability to speak as well as increased left sided weakness- Seen By Neuro- EEG -  temp lobe seizures on Vimpat, new CVA but MRI can't be done, Met encephalopathy/ heel Ulcers- Pneumonia/UTI ruled out. Hospital course complicated by SOMMER on CKD 2nd Contrast for scans and antibiotics- now improving- Increased sleepiness 2nd Vimpat- neuro to follow.   Saw cardiology recently - was started on torsemide 20mg and doing good job decreasing edema - using condom cath  Has appointment to see nephrology and neurology in the coming weeks and has endocrinology in a month  saw derm and was rx rinvoq for psoriatic arthritis  Has 2 growths on his chest that he would like to be looked at they have been using triamcinolone cream on it and it seems to have changed the characteristics from dark to lighter Has appointment with dermatology in a week - Dr Nan dillon - wound care getting treatments for bilateral heel ulcers and looking better   They also have questions about the paramedicine program at [facility]

## 2025-06-24 NOTE — PHYSICAL EXAM
[Normal Sclera/Conjunctiva] : normal sclera/conjunctiva [Normal Outer Ear/Nose] : the outer ears and nose were normal in appearance [No JVD] : no jugular venous distention [Normal] : no respiratory distress, lungs were clear to auscultation bilaterally and no accessory muscle use [Normal Rate] : normal rate  [Normal Affect] : the affect was normal [Normal Insight/Judgement] : insight and judgment were intact [de-identified] : In wheelchair [de-identified] : 1+ edema bilaterally [de-identified] : Scaly lesion lateral upper chest bilaterally

## 2025-06-24 NOTE — HEALTH RISK ASSESSMENT
[No falls in past year] : Patient reported no falls in the past year [Little interest or pleasure doing things] : 1) Little interest or pleasure doing things [Feeling down, depressed, or hopeless] : 2) Feeling down, depressed, or hopeless [1] : 2) Feeling down, depressed, or hopeless for several days (1) [PHQ-2 Negative - No further assessment needed] : PHQ-2 Negative - No further assessment needed [Time Spent: ___ Minutes] : I spent [unfilled] minutes performing a depression screening for this patient. [[id number]] : 2

## 2025-06-24 NOTE — PLAN
[FreeTextEntry1] : Advised to make appointment with geriatrics Will reach out to case management about para medicine program

## 2025-06-26 NOTE — PHYSICAL EXAM
Patient Education   Patient Education     Diabetes: Understanding Carbohydrates, Fats, and Protein  Food is a source of fuel and nourishment for your body. It’s also a source of pleasure. Having diabetes doesn’t mean you have to eat special foods or give up desserts. Instead, your dietitian can show you how to plan meals to suit your body. To start, learn how different foods affect blood sugar.  Carbohydrates  Carbohydrates (carbs) are the main source of fuel for the body. They raise blood sugar. Many people think carbohydrates are only in pasta or bread. But carbohydrates are in many kinds of foods. Carbs include:  · Sugars.These are naturally in foods such as fruit, milk, honey, and molasses. Sugars can also be added to many foods. They may be added to cereals and yogurt to candy and desserts. Sugars raise blood sugar.  · Starches. These are in bread, cereals, pasta, and dried beans. They’re found in corn, peas, potatoes, yam, acorn squash, and butternut squash. Starches raise blood sugar.   · Fiber. This is in foods such as vegetables, fruits, beans, and whole grains. Unlike other carbs, fiber isn’t digested or absorbed. So it doesn’t raise blood sugar. In fact, fiber can help keep blood sugar from rising too fast. It helps keep blood cholesterol at a healthy level.  Did you know?  Even though carbohydrates raise blood sugar, it’s best to have some in every meal. They are an important part of a healthy diet.  Fat  Fat is an energy source that can be stored until needed. Fat does not raise blood sugar. But it can raise blood cholesterol. This increases the risk of heart disease. Fat is high in calories. Eating too many calories can cause weight gain. Not all types of fat are the same.  More healthy:  · Monounsaturated fats. These are mostly found in vegetable oils, such as olive, canola, and peanut oils. They are found in avocados and some nuts. Monounsaturated fats are healthy for your heart. That’s because they  lower LDL (unhealthy) cholesterol.  · Polyunsaturated fats.These are mostly found in vegetable oils, such as corn, safflower, and soybean oils. They are found in some seeds, nuts, and fish. Polyunsaturated fats lower LDL (unhealthy) cholesterol. So, choosing them instead of saturated fats is healthy for your heart. Certain unsaturated fats can help lower triglycerides.   Less healthy:  · Saturated fats.These are found in animal products, such as meat, poultry, whole milk, lard, and butter. Saturated fats raise LDL cholesterol.They are not healthy for your heart.  · Hydrogenated oilsand trans fats. These are formed when vegetable oils are processed into solid fats. They are found in many processed foods. Hydrogenated oils and trans fats raise LDL (\"bad\") cholesterol and lower HDL (\"good\") cholesterol. They are not healthy for your heart.  Protein  Protein helps the body build and repair muscle and other tissue. Protein has little or no effect on blood sugar. But many foods that have protein also have saturated fat. By choosing low-fat protein sources, you can get the benefits of protein without the extra fat:  · Plant protein. This is found in dry beans and peas, nuts, and soy products, such as tofu and soymilk. These foods tend to have no cholesterol.Most are low in saturated fat.  · Animal protein. This is found in fish, poultry, meat, cheese, milk, and eggs. These foods have cholesterol.They can be high in saturated fat. Aim for lean, lower-fat choices. Don't eat fried foods.  [employer] last reviewed this educational content on 4/1/2019  © 8586-0280 [employer] [address] [place] [address], [place] [address]. All rights reserved. This information is not intended as a substitute for professional medical care. Always follow your healthcare professional's instructions.           Diabetes: Caring for Your Body    When you have diabetes, your body needs special care. This care helps you stay healthy and prevent  problems. Exercise and healthy eating are a part of this. You can also protect yourself by taking special care of your feet, skin, and teeth.  Caring for your feet  Follow these tips to help keep your feet healthy:  · Check your feet every day for redness, blisters, cracks, dry skin, or numbness. Use a mirror to check the bottoms of your feet, if needed. Or ask for help.  · Wash your feet in warm (not hot) water. Don’t soak them.  · Use an emery board to keep your toenails even with the ends of your toes. File away sharp edges. A foot doctor (podiatrist) may need to cut your toenails for you.  · Smooth down calluses gently or wait until you next podiatry appointment.  · Keep your skin soft and smooth by putting a thin layer of skin lotion on the tops and bottoms of your feet. Don't put lotion in between your toes.  · Always wear shoes or slippers, even inside your home. Make sure that shoes are correctly fitted. Change your socks daily. Always check shoes for foreign objects before putting them on.  · Call your healthcare provider right away if your feet are numb or painful. Also call your provider if a cut or sore doesn’t heal in a few days.  Preventing skin infections  To prevent skin infections, bathe every day. Dry yourself well, especially between your toes. Wash any cuts with warm, soapy water. Cover with a sterile bandage. Call your healthcare provider if a cut or sore doesn't heal in a few days, feels warm, itches, is swollen, or has a bad smell.  Caring for your teeth  Follow these guidelines for healthy teeth:  · Brush your teeth twice daily.  · Floss your teeth daily.  · See your dentist at least twice yearly.  · Keep your blood sugar in a good range.  If you smoke, quit  Smoking is dangerous for everyone, especially people with diabetes. It can harm the blood vessels in your eyes, kidneys, nerves, and heart. It raises blood pressure. Smoking can also slow healing, so infections are more likely. Ask your  [General Appearance - Alert] : alert healthcare provider about programs to help you stop smoking.  Isabel last reviewed this educational content on 4/1/2019  © 2018-5978 [employer] [address] [place] [address], [place] [address]. All rights reserved. This information is not intended as a substitute for professional medical care. Always follow your healthcare professional's instructions.            [General Appearance - In No Acute Distress] : in no acute distress [Sclera] : the sclera and conjunctiva were normal [Extraocular Movements] : extraocular movements were intact [Outer Ear] : the ears and nose were normal in appearance [Hearing Threshold Finger Rub Not [place]] : hearing was normal [Neck Appearance] : the appearance of the neck was normal [] : no respiratory distress [Exaggerated Use Of Accessory Muscles For Inspiration] : no accessory muscle use [Apical Impulse] : the apical impulse was normal [Heart Sounds] : normal S1 and S2 [Bowel Sounds] : normal bowel sounds [Abdomen Soft] : soft [Oriented To Time, Place, And Person] : oriented to person, place, and time [Affect] : the affect was normal [FreeTextEntry1] : decreased strngth L side

## 2025-06-26 NOTE — ASSESSMENT
[FreeTextEntry1] : Pleasant 81 yo male with longstanding DM ( 50+ yrs), CAD ( dual chamber pacemaker), AFIB, CHF(on entresto, lasix), BPH ( 5+ x/night on finasteride, US at Urologists showed no PVR per pt), required lloyd following hip#, HTN,  peripheral neuropathy, autonomic dysfunction, initially referred for elevated cr ( 2-2.2 from 1/2019 to 7/2019) c/w CKD 3 -   SOMMER on CKD - cr had improved to 1.7 while hospitalized, has increased to 1.94 - recheck today to guide med recs  CKD 3 recheck labs today rechecked weight - different scales, but has possibly lost ~20lbs since starting torsemide 6/19 recheck bmet today  HTN - at goal, consider stopping amlodipine 2/2 le edema provided BP ok  Edema - previously lost approx 23 lbs by combining eplerenone and lasix - check bmet , if ok will add eplerenone to regimen  Anemia - Hgb, iron panel stable  - cont nephrocap, no need for procrit.  Hypotension/orthostasis - hasnt really been standing or walking, so has not experienced this since discharge  Constipation -resolved  BPH - seeing Dr. Wilkinson, debating on GL, ? lloyd temporarily - would rx flomax, but will refrain due to risk of hypotension   Cardiology, Endocrine, PCP notes reviewed, hosp notes reviewed BMET, Hgb A1c, PTH, iron panel , CBC reviewed.   >60 min spent reviewing hospital records, meds, labs, imaging studies, discussing management of edema, htn, ckd3 and writing this note

## 2025-06-26 NOTE — HISTORY OF PRESENT ILLNESS
[FreeTextEntry1] : Don  82 male with longstanding DM ( 50+ yrs), CAD ( dual chamber pacemaker), AFIB, CHF (on entresto, lasix prn), BPH ( 5+ x/night was on finasteride, US at Urologists showed no PVR per pt), HTN , peripheral neuropathy, autonomic dysfunction,  initially referred for elevated cr ( 2-2.2 from 1/2019 to 7/2019) c/w CKD 3 -   6/2025 - s/p hospitalization for r/o CVA vs seizure, subsequent stay in rehab, during which he had episode of SOMMER with cr peaking at 2.8 following contrast study, improved to 1.7  - seen by cardiology about a week ago, lasix changed to torsemide - diagnosed with psoriasis for which he was prescribed rinvoke with marked improvement in itching and lesions - hereofore unknown that he had/has psoriasis - struggling with le edema, scrotal and penile edema, uses condom cath - struggling with setbacks due to repeated hospitalizations  BP stable, repeat labs show cr of1.94 (prior to increasing lasix and changing to torsemide 6/19)    3/2025 f/u CKD, ANS dysfunction , periph edema slow recovery following hip # and CVA diff mobilizing fluid, sig bruce le weakness ( deconditioned limiting mobility) has gained ~20 lbs    10/2024 f/u for CKD, autonomic dysfunction - s/p hospitalization for hip #, cva, was n rehab with limited recovery, being seen in wound care by IR, vascular and podiatry for le wounds, contine sto struggle with chronic venous insufficiency - wife states that after going to sleep , horizontally, edema nearly completely resolved by am, but over course of day le edema recures  has bruce foot drop  6/2024 here for f/u s/p hospitalization s/p hip #, ckd, autonomic dysfunction, hypotension, orthoistasis during hospitalization has had extreme diff controlling BP ( orthostatic, autonomic dysfunction), multiple confounding issues ( ie pain, pain meds, sleeping meds, hydration, malnutrition...) continues to have PT legs edematous ( edema had, for the most part, resolved prior to hospitalization with Hip#_ had been taking lasix on prn basis, during hospitalization it was held due to hypotension, hasnt resumed lasix, recommend he resume now, keep legs  elevated repeat labs today check xray   s/p surgical excision of melanoma R forearm - states he will need additional surgery, "too close to margins" s/p venous sclerotherapy R lower leg    3/2023 here for f/u ckd, autonomic dysfunction, hypotension s/p surgical excision of melanoma R forearm - states he will need additional surgery, "too close to margins" s/p venous sclerotherapy R lower leg  12/2023 since last visit, cracked R lower molar, complicated by development of " bone spurs" - was taking Motrin daily chronic pain noted to to be elevated since 10/2023  9/2023 here for f/u -  has since developed venous ulcer - seen by IR, wound care autonomic dysfunction DN pacemaker last labs in 2/2023, cr had improved to 1.6, gfr 43  - 2.18/20's,  8/2023 1.9/GFR  33 Endo, Cards, GI note reviewed   6/2023 here for f/u - returned from 16 day trip to [place], sig weight gain autonomic dysfunction DN pacemaker last labs in 2/2023, cr had improved to 1.6, gfr 43 Endo, Cards, GI note reviewed   12/2022 f/u SOMMER on CKD/CKD 3/4 - was exp great diff walking due to light headed ness/hypotension attributed to autonomic dysfunction along with loss of proprioception from peripheral neuropathy, d/w cardiology, increased heart to 70 with marked improvement in gait - less unsteadiness  Managing as best he can with disease burden.  Overall stable - cr fluctuating between 1.8 and 2.3 since 2019;   Was using florinef for dizziness hypotension - developed edema, so holding at this time -  BPH with nocturia - anywhere from 3-10x/night, improved with glass of wine  3/1/2023 - cr improved from 2.4 to 2.09 to 1.87, more his baseline and as of 2/2023 was 1.62 - fluid management remains an issue given his le edema and underlying heart disease - pth elevated ( seeing Dr. Jansen) - no longer light headed since increasing HR on pacemaker   9/2022 - cr improved from 2.4 to 2.09 to 1.87, more his baseline - fluid management remains an issue given his le edema and underlying heart disease  6/10/2022 - cr improved from 2.4 to 2.09, more his baseline - fluid management remains an issue given his le edema and underlying heart disease  4/2022 - continues to have diff controlling BS, A1c 7's - cr adrienne to highest on record since 2019 with a level of 2.49 in 2/2022,  suspects it may be 2/2 uncontrolled  blood sugars, will be having new pump as per Dr. Jansen

## 2025-07-01 NOTE — HISTORY OF PRESENT ILLNESS
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 Presenting with wife Judith for [facility] follow-up. He was admitted from 5/31-6/4/25. Treated for possible focal aware seizure. On  mg bid.   Wife reports that at breakfast, she noticed he suddenly couldn't say any words. Speech sounded garbled, slurred. He was not disoriented at all. He just did not make any sense. No definite other focal deficits. He was able to cooperate with her to drive to the ED. She estimates that by one day he was better but only by 3 days was he 100%.  He was tried on levetiracetam but was very "out of it". He had CT head/CTA with no LVO. No TNK as unclear LKW and he was compliant with eliquis. Repeat CT head did not show a stroke. EEG revealed occasional left temporal hyper-excitability. Given this, there was concern for possible focal aware seizure and he was started initially on  mg bid. He could not tolerate this, so he was changed to lacosamide 100 mg every 12 hours.  He reports feeling tired and not well and he thinks partly it could be related to this medication.   Also wife concerned as blood pressures are lower. Gave him a dose of midodrine today. He did feel a little dizzy before. BP today 92/62. He is in wheelchair today. Doing PT at home with Lisa.  Dr. Palma is moving to another location. Therefore, they are hoping to establish care with PCP who can come to the house as he has difficulties with mobility.   Notes from admission 5/31/25 Bin Fall is an 82-year-old  man with a past medical history of diabetes mellitus, hypertension, hyperlipidemia, atrail fibrillation on eliquis, heart failure with PPM/defibrillator that is not MRI compatible, gait instability/falls, peripheral neuropathy , orthostatic hypotension, left hip fracture s/p L ORIF, prior Left MCA stroke which presented with aphasia (now resolved), and a right posterior limb internal capsule stroke 7/2024 with residual left facial, left hemiparesis, LUE ataxia and dysarthria.   Per [facility] Notes, he woke up on 5/31/25 and was able to speak normally. Around 8 am, while eating breakfast he started speaking jibberish. Called as stroke code. NO LVO. Not a TNK candidate given worsening weakness over the past week, no clear last known normal. He was also compliant with Eliquis.  Vitals : Temp 97.6, heart rate 87, respiraton 18, O2 96 , /91                                                                                                                                                                                                                                 Repeat CT head the following day without new acute stroke. EEG (formal report pending) with left temporal LRDA.   Imaging: CTH, CTA H+N and CTP 5/31/25: 1.   Right carotid system:  No hemodynamically significant stenosis.  2.   Left carotid system:  Mild to moderate plaque at the distal LEFT CCA and proximal LEFT ICA and ECA resulting in a mild (less than 30%) stenosis of the distal LEFT CCA and moderate (50%) stenosis at the origin of the RIGHT ECA. The origin of the LEFT ICA is patent.  3.   Intracranial circulation:  No significant vascular lesion.  Mild vascular calcification involves the cavernous internal carotid arteries.  4.   Brain:  moderate periventricular and deep chronic white matter ischemia with old LEFT frontal cortical infarction. Old lacunar infarction in the RIGHT basal ganglia.  5.   Perfusion: No core infarct. 33 mL critically hypoperfused tissue at risk is identified in the BILATERAL frontal and LEFT parietal white matter.  6/4/25: creatinine 2.3 , hemoglobin a1c 7.1, TSH 3.3 , LDL 69.4   CONCLUSIONS:  1. Left ventricular cavity is normal in size. Left ventricular systolic function is normal with an ejection fraction visually estimated at 55 to 60 %. 2. Left ventricular endocardium is not well visualized. 3. Moderate left ventricular hypertrophy. 4. Based on visual assessment, the right ventricle appears mildly enlarged. mildly reduced right ventricular systolic function. 5. Device lead is visualized in the right heart. 6. Left atrium is severely dilated. 7. The right atrium is mildly dilated. 8. Mild aortic  stenosis. 9. The peak transaortic velocity is 1.58 m/s, peak transaortic gradient is 10.0 mmHg and mean transaortic gradient is 5.3 mmHg with an LVOT/aortic valve VTI ratio of 0.38. The effective orifice area is estimated at 1.38 cm by the continuity equation and indexed at 0.68 cm/m. 10. Mild tricuspid regurgitation. 11. Estimated pulmonary artery systolic pressure is 40 mmHg. 12. No pericardial effusion seen. 13. A pleural effusion is present. 14. Compared to the transthoracic echocardiogram performed on 4/3/2025, LVEF has improved.   Medications:  atorvastatin 10 mg daily  amlodipine 2.5 mg  - if systolic is > 110  vitamin d calcitriol, caltrate (holding for now, elevated calcium) elqiuis 2.5 mg every 12 hours humalog  lacosamide 100 mg every 12 hours levothyroxine 50 mcg  metoprolol 25 mg bid  prolia pyridostigmine 30 mg every 12 hours  rinvoq -tapered off it - three times a week, 2 times a week 2 weeks - dermatologist  tamsulosin  torsemide  midodrine   Social History:  Lives in [place] Not driving Retired - used to work as a   Not walking right now, mostly in wheelchair, starting this week - in PT - [facility] at home - [facility]   Allergies  None

## 2025-07-01 NOTE — PHYSICAL EXAM
[FreeTextEntry1] : General: elderly man in wheelchair  Mental Status: knows age, thinks it is end of June. Knows the year. Knows the name of the hospital. 3/3 recall of three items at 3 minutes. Can spell "world" backwards. Can follow commands across midline. Can name mouse, key , elbow, wrist, knuckle. Can repeat "today is a emerita day".  Language/Speech : speech slightly slurred.  Cranial Nerves  II: Pupils small, unequal (b/l surgical pupil), right pupil eccentric shape, reactive III, IV, VI: EOMI V : normal sensation in V1-V3 b/l VII : mild left NLF VIII: normal hearing to voice  IX, X: normal palatal elevation, uvula midline XI: 5/5 head turn and 5/5 shoulder shrug bilaterally XII : midline tongue protrusion, uvula deviates slightly to the left  Motor: mild left hemiparesis. Deltoids 5-/5 left, 5-/5 biceps, 4/5 triceps,  4/5 on left, orbiting of right arm around left, finger tapping slow on left   Sensory: normal to light touch  Reflexes: did not complete Toes (deferred, bandages from pressure ulcers) Coordination: ataxia with left FNF Gait: deferred

## 2025-07-01 NOTE — REASON FOR VISIT
[Follow-Up Visit] : a follow-up visit for [FreeTextEntry2] : HF, SOMMER, afib, ppm  [FreeTextEntry1] : Cerebrovascular event likely secondary to cardiac emboli with thrombolysis, 2014/probable brief TIA with aphasia March 2020 Coronary artery disease with left ventricular dysfunction out of proportion to the degree of disease, improved on therapy. Device therapy/[employer] dual-chamber biventricular pacemaker defibrillator Echo/valve abnormalities Carotid atherosclerosis Type 1 diabetes with chronic kidney disease and additional complications Dyslipidemia Peripheral vascular disease, both arterial and venous.  Status post right-sided venous ablation procedure. Hypertension with episodic orthostatic hypotension.  He has additional medical problems as noted.  His primary care physician is Doctor May Doran.

## 2025-07-01 NOTE — ASSESSMENT
[FreeTextEntry1] : Please decrease lacosamide  Week 1: lacosamide 50 mg in the morning and 100 mg at night Week 2: lacosamide 50 mg in the morning and 50 mg at night and continue at this dose   Op[place] - [place]. Zee Villasenor[place] [facility] [place]  Ambulatory EEG  Continue physical therapy  Telehealth visit in two months (1st appointment of the day if possible or last appointment of the day)   Seizure Safety:   Wear a helmet when biking or horseback riding No unsupervised swimming Showers rather than baths - no bath alone - risk of drowning  Adjust the temperature on hot water heater to avoid scalding If uncontrolled seizures, use microwave rather than stovetop Dont lock door in bathroom, bedroom or on places  If fall off bed with seizures, try sleeping with mattress on the floor  Use soft or padded furniture  Avoid high ladders  Take medication as prescribed Follow driving regulations of people with epilepsy.  Please visit [facility]ile[employer] [facility] : [email or web address]

## 2025-07-01 NOTE — DATA REVIEWED
[de-identified] : 3/31/2025 : cEEG: moderate diffuse or multifocal dysfunction. No findings of active  epilepsy, however this alone does not rule out diagnosis.  [de-identified] : 6/1/2025: CT head: no acute intracranial hemorrhage. Small chronic infarct left lateral frontal lobe.  Chronic lacunar infarct within the right gangliocapsular junction. Mild diffuse cerebral volume loss with  prominence of sulci, fissures, and cisternal space. Mild deep and periventricular white matter hypoattenuation statistically compatible with microvascular changes given calcific atherosclerotic disease of intracranial arteries.   5/2025 CTA head/neck  1.   Right carotid system:  No hemodynamically significant stenosis.      2.   Left carotid system:  Mild to moderate plaque at the distal LEFT CCA and proximal LEFT ICA and ECA resulting in a mild (less than 30%) stenosis of the distal LEFT CCA and moderate (50%) stenosis at the origin of the RIGHT ECA. The origin of the LEFT ICA is patent.      3.   Intracranial circulation:  No significant vascular lesion.  Mild vascular calcification involves the cavernous internal carotid arteries.      4.   Brain:  moderate periventricular and deep chronic white matter ischemia with old LEFT frontal cortical infarction. Old lacunar infarction in the RIGHT basal ganglia.  5.   Perfusion: No core infarct. 33 mL critically hypoperfused tissue at risk is identified in the BILATERAL frontal and LEFT parietal white matter.

## 2025-07-01 NOTE — DATA REVIEWED
[de-identified] : 3/31/2025 : cEEG: moderate diffuse or multifocal dysfunction. No findings of active  epilepsy, however this alone does not rule out diagnosis.  [de-identified] : 6/1/2025: CT head: no acute intracranial hemorrhage. Small chronic infarct left lateral frontal lobe.  Chronic lacunar infarct within the right gangliocapsular junction. Mild diffuse cerebral volume loss with  prominence of sulci, fissures, and cisternal space. Mild deep and periventricular white matter hypoattenuation statistically compatible with microvascular changes given calcific atherosclerotic disease of intracranial arteries.   5/2025 CTA head/neck  1.   Right carotid system:  No hemodynamically significant stenosis.      2.   Left carotid system:  Mild to moderate plaque at the distal LEFT CCA and proximal LEFT ICA and ECA resulting in a mild (less than 30%) stenosis of the distal LEFT CCA and moderate (50%) stenosis at the origin of the RIGHT ECA. The origin of the LEFT ICA is patent.      3.   Intracranial circulation:  No significant vascular lesion.  Mild vascular calcification involves the cavernous internal carotid arteries.      4.   Brain:  moderate periventricular and deep chronic white matter ischemia with old LEFT frontal cortical infarction. Old lacunar infarction in the RIGHT basal ganglia.  5.   Perfusion: No core infarct. 33 mL critically hypoperfused tissue at risk is identified in the BILATERAL frontal and LEFT parietal white matter.

## 2025-07-01 NOTE — DISCUSSION/SUMMARY
[FreeTextEntry1] : Bin is a pleasant 82-year-old RH gentleman with diabetes mellitus (hemoglobin a1c 7.1),   atrial fibrillation on eliquis, hypertension, hyperlipidemia, complete heart block, heart failure with pacemaker/defibrillator (not MRI compatible), left hip fracture s/p ORIF, left MCA territory stroke with aphasia (now resolved) and right posterior limb internal capsule stroke 7/2024 with residual left facial, left hemiparesis, left upper extremity ataxia and dysarthria, CKD and chronic ulcers of bilateral feet presenting after episode of transient aphasia on 5/31/25. Took a day to resolve. Unfortunately, cannot have an MRI. Had a repeat CT of the head which showed no stroke (although a small one may be missed on CT head). EEG revealed left temporal LRDA. Started on  mg bid with no recurrent events. Feels tired. Will decrease dose and get EEG.

## 2025-07-01 NOTE — PHYSICAL EXAM
[Well Developed] : well developed [Well Nourished] : well nourished [No Acute Distress] : no acute distress [Normal Conjunctiva] : normal conjunctiva [Normal Venous Pressure] : normal venous pressure [No Carotid Bruit] : no carotid bruit [Normal S1, S2] : normal S1, S2 [No Murmur] : no murmur [No Rub] : no rub [No Gallop] : no gallop [Clear Lung Fields] : clear lung fields [Good Air Entry] : good air entry [No Respiratory Distress] : no respiratory distress  [Soft] : abdomen soft [Non Tender] : non-tender [No Masses/organomegaly] : no masses/organomegaly [Normal Bowel Sounds] : normal bowel sounds [No Cyanosis] : no cyanosis [No Clubbing] : no clubbing [No Varicosities] : no varicosities [No Rash] : no rash [Moves all extremities] : moves all extremities [Normal Speech] : normal speech [Alert and Oriented] : alert and oriented [Normal memory] : normal memory [de-identified] : 2+ pitting edema is all but gone, now trace edema. [de-identified] : in wheelchair,

## 2025-07-01 NOTE — HISTORY OF PRESENT ILLNESS
[FreeTextEntry1] : 82M former patient of Dr. Baron.  NICM dx ~ 2014, no significant CAD on cath at the time  (distal LCx 80%), HTN, AV block s/p BiV ICD, chronic atrial fibrillation on Eliquis, chronic foot ulcer, PAD, s/p toe amputations, CKD stage 3, dyslipidemia, Type 1 DM, diabetic peripheral neuropathy, acquired hypothyroidism, carotid atherosclerosis, hx of stroke 2014 s/p TPA, TIA 3/19/20, and recent stroke July 2024 with residual L sided weakness.  Eliquis increased to 5mg BID after this most recent stroke.  He was also in hospital/rehab for extended period of time in April/May 2024 for a left femur fracture that required repair and rehab.    He is currently minimally ambulatory due to the L leg weakness and has been dealing with chronic bilateral heel ulcerations requiring regular wound care.  He has a hx of PAD and is scheduled for peripheral angio next week with Dr. Zayas.   He has no complaints of SOB or CP.  He has chronic LE edema which per patient is improving.  He takes lasix 20mg daily.   1/7/24:  since last visit had peipheral angiogram.   noted to have significant SOMMER in december, lasix held.  restarted at lower dose 20mg ~ 1 week ago.  he notes some weight gain.  no new sob.  + leg swelling.   1/28/25:  weight has come down a little, has been taking 40mg lasix daily since last visit.  leg swelling mildly improved.  no sob.   4/22/25: recent extended hospitalization for osteomyelitis heel wounds, heart failure, orthostatic hypotension, uncontrolled htn discharged on 4/15/25.  Started on pyridostigmine with some benefit in orthostasis.  Since dc we increased his lopressor due to hypertension.  He has had very little dizziness since being home.  Leg swelling still present.  No sob.  Taking lasix 20mg daily.   6/19/25: since last visit patient presented to [place] on 5/31 with complaint of inability to speak as well as increased left sided weakness- Seen By Neuro- EEG with possible temp lobe seizures started on Vimpat, unable to obtain MRI  Hospital course complicated by SOMMER on CKD 2nd Contrast for scans and antibiotics- - transferred to acute rehab and discharged on 6/12/25.  Since discharge his main concern is his leg swelling.   Making it difficult to ambulate.   + BLACKBURN.  Coughs when he changes positions.  No palps. No chest pain.   He has been taking lasix 20mg daily since dc.  It had been held throughout the ~ 2 week hospital stay. 7/1/25: marked improvement in his sob/leg swelling since last visit, down ~ 20 lbs, switched to torsemide 20mg daily at our last visit.  he feels much better.

## 2025-07-01 NOTE — CARDIOLOGY SUMMARY
[de-identified] : EKG 6/1/2021.  Underlying atrial fibrillation likely, unconfirmed.  Biventricular pacing with rightward axis.  No significant change. [de-identified] : Lexiscan MIBI 5/6/14. EF 25%. Fixed inferior defect with generalized hypokinesia. No  overt ischemia.\par  Stress test 5/1/15. 4 minutes. Intermediate protocol. Gorod stage I. 3.5 METs.  57%. Nondiagnostic ECG (LBBB). \par   [de-identified] : Echo 4/3/25: 1. Left ventricular cavity is normal in size. Left ventricular wall thickness is normal. Left ventricular systolic function is mildly decreased with an ejection fraction visually estimated at 45 to 50 %. Global left ventricular hypokinesis. Basal/mid inferolateral, basal inferior wall appear most hypokinetic. 2. Unable to assess left ventricular diastolic function due to insufficient data. 3. Enlarged right ventricular cavity size, with normal wall thickness, and borderline reduced right ventricular systolic function. 4. Left atrium is severely dilated. 5. No significant valvular disease. 6. Mild to moderate mitral regurgitation. 7. Mild tricuspid regurgitation. 8. Estimated pulmonary artery systolic pressure is 33 mmHg, consistent with normal pulmonary artery pressure. 9. The inferior vena cava is normal in size (normal <2.1cm) with normal inspiratory collapse (normal >50%) consistent with normal right atrial pressure ( R 3, range 0-5mmHg). 10. No pericardial effusion seen. 11. Left pleural effusion noted. 12. Device lead is visualized in the right heart. 13. Compared to prior study 12/3/2024 LVEF has mildly declined.  Echo July 2023.  Normal left ventricular chamber size, thickness and overall systolic function.  EF estimated 55%.  Mildly dilated left and right atrium.  Mild MAC and chordal calcification.  Trace MR.  Mild aortic calcification without stenosis.  Trivial AI.  Device lead visualized right heart.  Trace TR.  Mildly elevated estimated PA, 51 mmHg.  Normal RV systolic function.  Compared with 3/30/2021, no significant change.  Echo.  3/30/2021.  A.  F.  Normal LV function and wall motion.  EF 60%.  Dilated atria.  MAC.  Mild or moderate MR.  Aortic sclerosis.  Trace AI.  PA 55.  Echo 8/2/19. A. F. Normal LV function. EF 65%. LVH, 1.3 cm. Dilated left and right atrium.     Calcified chordae tendineae. Moderate MR. Aortic calcification without stenosis. Mild TR.     PA 40 mm of mercury. Similar to 2017. Echo 4/11/2016. Normal LV function. Probable normal wall motion. LVH, 1.2 cm. EF 65%.    Mildly thickened mitral valve, mild MAC. Trace MR. Trivial aortic sclerosis with trace AI.   Gradient 9/4. PA 17-22.   JAMEL 6/4/14. EF 20-25%. Performed pre-cardioversion.  [de-identified] : MUGA 2/23/15. Ischemic cardiomyopathy, EF 33%.\par   [de-identified] : Dual-chamber implanted defibrillator, Doctor Anthony, 4/3/2015. LV lead could not be placed  initially. Re-attempt 7/2015 was successful [employer] ( [employer] ) device\par   [de-identified] : Vascular evaluation 3/5/2022.  Greater than 75% stenosis right distal popliteal/proximal tibial peroneal trunk.  Right occluded peroneal artery.  Left occluded peroneal and anterior tibial artery.  Bilateral dorsalis pedis arteries not visualized.  Heavily calcification trifurcation.  Borderline reduced ABIs.\par  Carotid duplex 12/11/2019. Mild plaque bilateral. No change from 2015.\par   [de-identified] : Cardiac catheterization 6/4/14. Distal LCx 80% stenosis, 40% proximal. Medical therapy. LV dysfunction is out of proportion to the degree of CAD. \par   [de-identified] : CT scan chest 1/ Left lower lobe consolidationand associated layering pleural effusion.  Coronary calcifications.\par  PFT 1/13/16. Moderately severe diffusion defect, unchanged from 2014.\par  PFT obtained from 6/30/2014. Minimal obstructive airways disease. Moderate diffusion  defect. Refer to complete report. DLCO 56% predicted. Baseline study. Amiodarone just  started. \par  \par   [de-identified] : Echo 12/3/24: 1. Left ventricular cavity is normal in size. Left ventricular wall thickness is normal. Left ventricular systolic function is normal with an ejection fraction of 54 % by Acosta's method of disks. There are no regional wall motion abnormalities seen. 2. Moderate asymmetric left ventricular hypertrophy. 3. Unable to assess left ventricular diastolic function due to insufficient data. 4. Moderately enlarged right ventricular cavity size and borderline reduced right ventricular systolic function. 5. Left atrium is moderately dilated. 6. The right atrium is dilated. 7. Probably mild aortic stenosis. 8. Mild mitral regurgitation. 9. Mild tricuspid regurgitation. 10. Estimated pulmonary artery systolic pressure is 59 mmHg, consistent with moderate-to-severe pulmonary hypertension. 11. No pericardial effusion seen. 12. Left pleural effusion noted. 13. Device lead is visualized in the right heart.

## 2025-07-01 NOTE — ASSESSMENT
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   Complicated by SOMMER on CKD thought to be from EDYTA.   Now with increased LE edema.    - started on torsemide 20mg since last visit ~ 2 weeks ago for leg swelling/weight gain/esteban.  Marked improvement down 20 lbs, trace edema, and no resp complaints.  He is near his dry weight.  He had a lower normal BP reading today at home.  Normotensive today in office and not endorsing palpitations/dizziness/orthostatic symptoms.  We will lower regimen to 20mg eod to maintain euvolemia and avoid hypovolemia/acute kidney injury.   Repeat BMP in 1 month at next visit.   - continue eliquis for stroke prevention, lower to 2.5mg bid given age/renal failure -  continue lopressor 25mg bid for HTN/CHF.    - off entresto due to sommer/orthostatic hypotension, started on low dose norvasc 2.5mg during hospital stay.  If BP readings low and/or signs of orthostasis advise to dc norvasc.   - continue pyridostigmine 30mg TID for orthostatic hypotension, has helped significantly  - continue lipitor 10mg for CAD/HLD/PAD - compress and elevated legs when possible, ambulate as tolerated

## 2025-07-01 NOTE — REVIEW OF SYSTEMS
[Negative] : Heme/Lymph [FreeTextEntry2] : Chronic fatigue.  Poor sleeping due to nocturia.  Slightly improved. [FreeTextEntry3] : History of bilateral cataract surgery with lens implant.  Diabetic retinopathy.  Laser surgery.  Right vitrectomy. [FreeTextEntry5] : See HPI. [FreeTextEntry6] : Chronic cough and throat clearing. [FreeTextEntry7] : Chronic episodic abdominal discomfort associated with diabetic gastroparesis, although reportedly the diagnosis is unconfirmed..  Episodic constipation. [FreeTextEntry8] : Significant nocturia, every 2 hours at times, somewhat improved.  Daytime frequency.  ED, permanent.  He has a penile implant.  Consultation with Dr. Wilkinson. [FreeTextEntry9] : Improved left shoulder injury.  Venous and arterial insufficiency.  Status post right-sided venous ablation procedure 2023. [de-identified] : History of squamous cell skin cancer.  Chronic lower extremity lesions. [de-identified] : He has a peripheral neuropathy.  Chronic imbalance.

## 2025-07-01 NOTE — ASSESSMENT
[FreeTextEntry1] : Please decrease lacosamide  Week 1: lacosamide 50 mg in the morning and 100 mg at night Week 2: lacosamide 50 mg in the morning and 50 mg at night and continue at this dose   Ophthalmology - Dr. Zee Villasenor, [facility]  Ambulatory EEG  Continue physical therapy  Telehealth visit in two months (1st appointment of the day if possible or last appointment of the day)   Seizure Safety:   Wear a helmet when biking or horseback riding No unsupervised swimming Showers rather than baths - no bath alone - risk of drowning  Adjust the temperature on hot water heater to avoid scalding If uncontrolled seizures, use microwave rather than stovetop Dont lock door in bathroom, bedroom or on places  If fall off bed with seizures, try sleeping with mattress on the floor  Use soft or padded furniture  Avoid high ladders  Take medication as prescribed Follow driving regulations of people with epilepsy.  Please visit [facility] : [email or web address]

## 2025-07-28 NOTE — PHYSICAL EXAM
[Alert] : alert [Well Nourished] : well nourished [Healthy Appearance] : healthy appearance [No Acute Distress] : no acute distress [Well Developed] : well developed [Normal Voice/Communication] : normal voice communication [Normal Sclera/Conjunctiva] : normal sclera/conjunctiva [No Lid Lag] : no lid lag [Normal Outer Ear/Nose] : the ears and nose were normal in appearance [Normal Hearing] : hearing was normal [Normal Lips/Gums] : the lips and gums were normal [No Respiratory Distress] : no respiratory distress [No Accessory Muscle Use] : no accessory muscle use [Normal Rate and Effort] : normal respiratory rate and effort [No Involuntary Movements] : no involuntary movements were seen [No Tremors] : no tremors [Oriented x3] : oriented to person, place, and time [Kyphosis] : no kyphosis present [Scoliosis] : no scoliosis [de-identified] : In wheelchair, casts on both legs for wounds [de-identified] : Osteonecrosis of the jaw [de-identified] : has pretibial edema b/l , Irregularly irregular Dual-chamber implanted defibrillator, Doctor Anthony, 4/3/2015.  on 2/2022

## 2025-07-28 NOTE — END OF VISIT
[Time Spent: ___ minutes] : I have spent [unfilled] minutes of time on the encounter which excludes teaching and separately reported services. [FreeTextEntry3] :  I, Lorenzo Calvo, am scribing for and in the presence of Dr. Milana Jansen in the following sections: HISTORY OF PRESENT ILLNESS; REVIEW OF SYSTEMS; PHYSICAL EXAM; ASSESSMENT/ PLAN.I, Milana Jansen, personally performed the services described in the documentation, reviewed the documentation recorded by the scribe in my presence, and it accurately and completely records my words and actions.  07/28/25

## 2025-07-28 NOTE — HISTORY OF PRESENT ILLNESS
[FreeTextEntry1] : Last Prolia 07/07/25, 7th injection, 01/03/25, 6th injection; Prolia 6/17/24, fifth injection 81 yo WM with brittle HIRAM coming for f/u. Was on Medtronic pump, does not use bolus wizard, does not enter carbs or BG values. as he does not like it, due to his gastroparesis, tried square waves and he feels like his sugars were low at the end of the bolus for dinner  taking Calcium citrate 600mg bid and Vit D3 at 2,000 IU qd on top of Calcitriol, sees Dr Houser , parathyroid much improved had a fall hurt his knee does not exercise any longer   takes Synthroid at 2am when he goes to the bathroom, then goes back to sleep, still has diarrhea on and off, never increased his Synthroid as advised but TFTs better DEXCOM CGM downloaded and reviewed with the pt Medtronic insulin pump downloaded and reviewed with the pt     type 1 DM/HIRAM since age 27, complicated with nephropathy,retinopathy, ++ gastroparesis sees Dr Dominguez , PVD toe amputations  last seen Nutritionist two years ago   had diabetes classes more than 10 yrs ago , not interested to have again  on Medtronic insulin pump for 10yrs, present pump more than a year, with Humalog   Calibrates CGM once daily sometime less despite advised bid  severe hypoglicemia 1974, 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 checks 3-4 times during exercise and takes snacks.   does roughly 3/4 of an exchange for one unit of Humalog  bolusing 2-10 times a day now on Dexcom, Omnipod Pump, on Humalog      Microvascular complications:   Nephropathy yes, last EGFR 31 Cr 2.1, + 1 proteins in urine had 24hr urine for proteins with Dr Doran, had more Advil in the last 2 months due to shoulder pain, stopped completelly a week ago per pt   Neuropathy symptoms, denies Dr. Wray seen him today   Retinopathy yes bilateral lasser surgery and Vitrectomy, cataract last eye exam more than a year ago Dr Funez [place] , Date of Exam Performed 10/17/2018  [facility] reviewed   Macrovascular complications:   HTN at goal on Carvedilol , Entresto   CAD/CVA yes, CVA 18 months ago, left ventricular dysfunction, congestive heart failure, paroxysmal atrial fibrillation, valve abnormalities, coronary artery disease, sees Dr Baron regularly   Lipids at goal on Simvastatin 5mg qd with LFT's normal 4/16   last thyroid US 9/17 IMPRESSION: Mildly enlarged and somewhat heterogeneous thyroid without focal masses. on Synthroid 25mcg qd reports compliance   07/28/25 Patient is okay. Accompanied by wife. Pt was discharged from the hospital on 06/12/25 as they thought the pt had temporal lobe seizures. Pt had trouble speaking, identical to when he had a stroke, but could not find it on the CT scan. Pt was in the hospital and had Omnipod the entire time. His left side, which had weakness from past stroke, was slightly weaker again but pt also had a lot of water weight. They did EEG, EKG and other tests. Pt was put on Vimpat but it made him sleepy all day. They saw neuro Dr. Schmidt and pt did overnight EEG. They said if there is no sign of a seizure, they will take the pt off Vimpat. The patient and his wife called back for results but never heard back.   Pt was prescribed Amlodipine 2.5 mg in the hospital but ran out of it a month ago. Pt experienced swelling from it. Not taking Calcium and Vitamin D anymore as he got called from Dr. Houser's office telling him he had too much Calcium in the blood.   Reports when pt boluses, he adds 10 extra grams of carbs, because if he doesn't, it's not enough to control the sugar. It happens more often for breakfast and lunch. They will change the pump today. Last week, readings were all high and unable to go below 200. They called 3 days ago and was told to take off the pump as there was something wrong with it. The pod was the issue and they sent Omnipod the defective pod.   Pt takes Jett with water mixed in for wound healing. The original wound has fully healed. Patient's wife puts on Aquaphor. The other wound is still healing.